# Patient Record
Sex: FEMALE | Race: WHITE | NOT HISPANIC OR LATINO | Employment: FULL TIME | URBAN - METROPOLITAN AREA
[De-identification: names, ages, dates, MRNs, and addresses within clinical notes are randomized per-mention and may not be internally consistent; named-entity substitution may affect disease eponyms.]

---

## 2017-09-01 ENCOUNTER — OFFICE VISIT (OUTPATIENT)
Dept: FAMILY MEDICINE CLINIC | Facility: CLINIC | Age: 27
End: 2017-09-01

## 2017-09-01 VITALS
HEIGHT: 62 IN | DIASTOLIC BLOOD PRESSURE: 82 MMHG | BODY MASS INDEX: 44.53 KG/M2 | SYSTOLIC BLOOD PRESSURE: 124 MMHG | OXYGEN SATURATION: 95 % | HEART RATE: 58 BPM | TEMPERATURE: 98.2 F | WEIGHT: 242 LBS

## 2017-09-01 DIAGNOSIS — F32.A ANXIETY AND DEPRESSION: Primary | ICD-10-CM

## 2017-09-01 DIAGNOSIS — F41.9 ANXIETY AND DEPRESSION: Primary | ICD-10-CM

## 2017-09-01 DIAGNOSIS — L73.2 HIDRADENITIS SUPPURATIVA: ICD-10-CM

## 2017-09-01 DIAGNOSIS — Z00.00 ROUTINE GENERAL MEDICAL EXAMINATION AT A HEALTH CARE FACILITY: ICD-10-CM

## 2017-09-01 DIAGNOSIS — E66.01 OBESITY, CLASS III, BMI 40-49.9 (MORBID OBESITY) (HCC): ICD-10-CM

## 2017-09-01 PROBLEM — E66.813 OBESITY, CLASS III, BMI 40-49.9 (MORBID OBESITY): Status: ACTIVE | Noted: 2017-09-01

## 2017-09-01 PROCEDURE — 99204 OFFICE O/P NEW MOD 45 MIN: CPT | Performed by: NURSE PRACTITIONER

## 2017-09-01 RX ORDER — BUSPIRONE HYDROCHLORIDE 5 MG/1
5 TABLET ORAL 3 TIMES DAILY
Qty: 90 TABLET | Refills: 0 | Status: SHIPPED | OUTPATIENT
Start: 2017-09-01 | End: 2017-09-25 | Stop reason: SINTOL

## 2017-09-01 RX ORDER — CLINDAMYCIN PHOSPHATE 10 MG/G
GEL TOPICAL 2 TIMES DAILY
Qty: 60 G | Refills: 2 | Status: SHIPPED | OUTPATIENT
Start: 2017-09-01 | End: 2018-06-06 | Stop reason: SDUPTHER

## 2017-09-01 RX ORDER — PROPRANOLOL HYDROCHLORIDE 20 MG/1
20 TABLET ORAL DAILY
Refills: 0 | COMMUNITY
Start: 2017-06-09 | End: 2017-09-25

## 2017-09-01 NOTE — PROGRESS NOTES
Subjective   Star Rollins is a 27 y.o. female. Establish care and discuss weight management. She was going to primary care in [place] before coming here but has not been in a long time. Mother comes here as a patient. She was seeing a dermatologist for HS but cannot go back there because they do not take her insurance. She was being treated with Inderal on as needed basis for anxiety. She does not think that is working well for her because the anxiety is getting worse and she is having panic attacks now. She was diagnosed as a child with ADD but was never put on medication. She would like to see about getting put on something different for the anxiety. It's causing her significant distress and she is not sleeping well. States she recently has a break up with her SO and thinks this had made the anxiety worse.   She is also concerned about her weight and would like to see about getting something to help with this.     Anxiety   Presents for initial visit. Onset was more than 5 years ago. The problem has been gradually worsening. Symptoms include compulsions, decreased concentration, depressed mood, excessive worry, insomnia, irritability, nervous/anxious behavior, obsessions and panic. Patient reports no suicidal ideas. Symptoms occur constantly. The severity of symptoms is moderate. The symptoms are aggravated by social activities. The quality of sleep is fair. Nighttime awakenings: several.     Risk factors include family history and a major life event. Her past medical history is significant for anxiety/panic attacks. (ADD) Past treatments include nothing. The treatment provided mild relief. Compliance with prior treatments has been good.      Sores on abdomen and under arms. She has history of HS and has multple open areas right now that have pus draining from them as well as painful to touch. She has been using some old ointment given to her by the other dermatologist but does not seem to be working very well.  She does wash the areas with dial soap and applies the ointment.          The following portions of the patient's history were reviewed and updated as appropriate: allergies, current medications, past family history, past medical history, past social history, past surgical history and problem list.    Review of Systems   Constitutional: Positive for irritability.   HENT: Negative.    Eyes:        Pressure on optic nerves in the eyes.    Respiratory: Negative.    Cardiovascular: Negative.    Gastrointestinal: Negative.    Endocrine: Negative.    Genitourinary: Negative.    Musculoskeletal: Negative.    Skin:        HS- needs referral to new dermatologist     Allergic/Immunologic: Negative.    Neurological: Negative.    Hematological: Negative.    Psychiatric/Behavioral: Positive for decreased concentration and dysphoric mood. Negative for self-injury and suicidal ideas. The patient is nervous/anxious and has insomnia.        Objective   Physical Exam   Constitutional: She is oriented to person, place, and time. Vital signs are normal. She appears well-developed and well-nourished. She is cooperative.   HENT:   Right Ear: External ear normal.   Left Ear: External ear normal.   Nose: Nose normal.   Mouth/Throat: Oropharynx is clear and moist.   Eyes: EOM are normal. Pupils are equal, round, and reactive to light.   Neck: Normal range of motion. No thyromegaly present.   Cardiovascular: Normal rate, regular rhythm, normal heart sounds and intact distal pulses.    Pulmonary/Chest: Effort normal and breath sounds normal.   Abdominal: Soft. Normal appearance and bowel sounds are normal.   Musculoskeletal: Normal range of motion.   Neurological: She is alert and oriented to person, place, and time. She has normal strength.   Skin: Skin is warm and dry.   Multiple areas of raised lesions on the abdomen, under both arms with central fluctuance, some erythema c/w HS ulcers. + scarring   Psychiatric: She has a normal mood and  "affect. Her behavior is normal. Judgment and thought content normal. Cognition and memory are normal.   Nursing note and vitals reviewed.    Vitals:    09/01/17 1452   BP: 124/82   Pulse: 58   Temp: 98.2 °F (36.8 °C)   TempSrc: Oral   SpO2: 95%   Weight: 242 lb (110 kg)   Height: 62\" (157.5 cm)         Assessment/Plan   Diagnoses and all orders for this visit:    Anxiety and depression  -     busPIRone (BUSPAR) 5 MG tablet; Take 1 tablet by mouth 3 (Three) Times a Day.    Hidradenitis suppurativa  -     Ambulatory Referral to Dermatology  -     clindamycin (CLINDAGEL) 1 % gel; Apply  topically 2 (Two) Times a Day.    Routine general medical examination at a health care facility  -     CBC & Differential  -     Comprehensive Metabolic Panel  -     Hemoglobin A1c  -     Lipid Panel  -     TSH  -     Vitamin D 25 Hydroxy  -     Insulin, Random  -     T4, Free    Obesity, Class III, BMI 40-49.9 (morbid obesity)  -     TSH  -     Insulin, Random    Anxiety and depression: Will get her started on some Buspar to treat anxiety and depression. Start with once daily for one week, then increase to twice daily and then TID if needed. Follow up in 3-4 weeks for recheck. Will adjust medication as needed.    HS: Will refer to dermatology. Use clindagel to treat the open areas.    Will get labs for health screening. If all is ok, she will make an appointment to discuss options for weight management.   Will get her medical records and review.           "

## 2017-09-06 LAB
25(OH)D3+25(OH)D2 SERPL-MCNC: 29.9 NG/ML (ref 30–100)
ALBUMIN SERPL-MCNC: 4.5 G/DL (ref 3.5–5.5)
ALBUMIN/GLOB SERPL: 1.5 {RATIO} (ref 1.2–2.2)
ALP SERPL-CCNC: 39 IU/L (ref 39–117)
ALT SERPL-CCNC: 14 IU/L (ref 0–32)
AST SERPL-CCNC: 13 IU/L (ref 0–40)
BASOPHILS # BLD AUTO: 0 X10E3/UL (ref 0–0.2)
BASOPHILS NFR BLD AUTO: 0 %
BILIRUB SERPL-MCNC: 0.3 MG/DL (ref 0–1.2)
BUN SERPL-MCNC: 21 MG/DL (ref 6–20)
BUN/CREAT SERPL: 25 (ref 9–23)
CALCIUM SERPL-MCNC: 9.3 MG/DL (ref 8.7–10.2)
CHLORIDE SERPL-SCNC: 100 MMOL/L (ref 96–106)
CHOLEST SERPL-MCNC: 138 MG/DL (ref 100–199)
CO2 SERPL-SCNC: 20 MMOL/L (ref 18–29)
CREAT SERPL-MCNC: 0.84 MG/DL (ref 0.57–1)
EOSINOPHIL # BLD AUTO: 0.1 X10E3/UL (ref 0–0.4)
EOSINOPHIL NFR BLD AUTO: 2 %
ERYTHROCYTE [DISTWIDTH] IN BLOOD BY AUTOMATED COUNT: 13.3 % (ref 12.3–15.4)
GLOBULIN SER CALC-MCNC: 3 G/DL (ref 1.5–4.5)
GLUCOSE SERPL-MCNC: 87 MG/DL (ref 65–99)
HBA1C MFR BLD: 5.2 % (ref 4.8–5.6)
HCT VFR BLD AUTO: 38.7 % (ref 34–46.6)
HDLC SERPL-MCNC: 37 MG/DL
HGB BLD-MCNC: 12.5 G/DL (ref 11.1–15.9)
IMM GRANULOCYTES # BLD: 0 X10E3/UL (ref 0–0.1)
IMM GRANULOCYTES NFR BLD: 0 %
INSULIN SERPL-ACNC: 12 UIU/ML
LDLC SERPL CALC-MCNC: 84 MG/DL (ref 0–99)
LYMPHOCYTES # BLD AUTO: 2 X10E3/UL (ref 0.7–3.1)
LYMPHOCYTES NFR BLD AUTO: 22 %
MCH RBC QN AUTO: 30.4 PG (ref 26.6–33)
MCHC RBC AUTO-ENTMCNC: 32.3 G/DL (ref 31.5–35.7)
MCV RBC AUTO: 94 FL (ref 79–97)
MONOCYTES # BLD AUTO: 0.5 X10E3/UL (ref 0.1–0.9)
MONOCYTES NFR BLD AUTO: 6 %
NEUTROPHILS # BLD AUTO: 6.2 X10E3/UL (ref 1.4–7)
NEUTROPHILS NFR BLD AUTO: 70 %
PLATELET # BLD AUTO: 317 X10E3/UL (ref 150–379)
POTASSIUM SERPL-SCNC: 4.7 MMOL/L (ref 3.5–5.2)
PROT SERPL-MCNC: 7.5 G/DL (ref 6–8.5)
RBC # BLD AUTO: 4.11 X10E6/UL (ref 3.77–5.28)
SODIUM SERPL-SCNC: 141 MMOL/L (ref 134–144)
T4 FREE SERPL-MCNC: 1.19 NG/DL (ref 0.82–1.77)
TRIGL SERPL-MCNC: 86 MG/DL (ref 0–149)
TSH SERPL DL<=0.005 MIU/L-ACNC: 1.56 UIU/ML (ref 0.45–4.5)
VLDLC SERPL CALC-MCNC: 17 MG/DL (ref 5–40)
WBC # BLD AUTO: 8.8 X10E3/UL (ref 3.4–10.8)

## 2017-09-07 ENCOUNTER — TELEPHONE (OUTPATIENT)
Dept: FAMILY MEDICINE CLINIC | Facility: CLINIC | Age: 27
End: 2017-09-07

## 2017-09-07 NOTE — TELEPHONE ENCOUNTER
----- Message from NICKY Kwon sent at 9/7/2017  8:35 AM EDT -----  Call the patient on the lab results. Labs all look good. The thyroid studies were normal range. Recommend rescreen every 1 to 2 years.

## 2017-09-15 ENCOUNTER — OFFICE VISIT (OUTPATIENT)
Dept: FAMILY MEDICINE CLINIC | Facility: CLINIC | Age: 27
End: 2017-09-15

## 2017-09-15 VITALS
SYSTOLIC BLOOD PRESSURE: 126 MMHG | BODY MASS INDEX: 44.35 KG/M2 | HEART RATE: 73 BPM | DIASTOLIC BLOOD PRESSURE: 74 MMHG | HEIGHT: 62 IN | OXYGEN SATURATION: 99 % | TEMPERATURE: 98.5 F | WEIGHT: 241 LBS

## 2017-09-15 DIAGNOSIS — Z79.899 MEDICATION MANAGEMENT: ICD-10-CM

## 2017-09-15 DIAGNOSIS — E66.01 OBESITY, CLASS III, BMI 40-49.9 (MORBID OBESITY) (HCC): Primary | ICD-10-CM

## 2017-09-15 PROCEDURE — 99213 OFFICE O/P EST LOW 20 MIN: CPT | Performed by: NURSE PRACTITIONER

## 2017-09-15 RX ORDER — PHENTERMINE HYDROCHLORIDE 37.5 MG/1
37.5 TABLET ORAL
Qty: 30 TABLET | Refills: 0 | Status: SHIPPED | OUTPATIENT
Start: 2017-09-15 | End: 2019-09-06

## 2017-09-15 NOTE — PROGRESS NOTES
"Subjective   Star Rollins is a 27 y.o. female. Weight management. She would like to use something to help with her weight and to control her appetite. She often eats due to boredom. She does drink water but also drinks Coke Zero. She does not exercise regularly at this time. She has tried some weight loss drugs in the past and lost some weight with them. She is interested in trying the phentermine for now.     Obesity   This is a new problem. The current episode started more than 1 year ago. The problem occurs constantly. Exacerbated by: boredom, family history. Treatments tried: gym, stopped soft drinks, daily food intake. The treatment provided no relief.        The following portions of the patient's history were reviewed and updated as appropriate: allergies, current medications, past medical history, past social history, past surgical history and problem list.    Review of Systems   Constitutional: Negative.    Respiratory: Negative.    Cardiovascular: Negative.        Objective   Physical Exam   Constitutional: Vital signs are normal. She appears well-developed and well-nourished. She is cooperative.   Cardiovascular: Normal rate, regular rhythm and normal heart sounds.    Pulmonary/Chest: Effort normal and breath sounds normal.   Neurological: She is alert.   Psychiatric: She has a normal mood and affect. Her speech is normal and behavior is normal. Judgment and thought content normal. Cognition and memory are normal.   Nursing note and vitals reviewed.    Vitals:    09/15/17 1545   BP: 126/74   Pulse: 73   Temp: 98.5 °F (36.9 °C)   TempSrc: Oral   SpO2: 99%   Weight: 241 lb (109 kg)   Height: 62\" (157.5 cm)       Assessment/Plan   Diagnoses and all orders for this visit:    Obesity, Class III, BMI 40-49.9 (morbid obesity)  -     phentermine (ADIPEX-P) 37.5 MG tablet; Take 1 tablet by mouth Every Morning Before Breakfast.  -     Pain Management Profile (13 Drugs) Urine    Medication management  -     Pain " Management Profile (13 Drugs) Urine    As part of this patient's treatment plan, I am prescribing controlled substances. The patient has been made aware of appropriate use of such medications, including potential risk of somnolence, limited ability to drive and /or work safely, and potential for dependence or overdose. It has also been made clear that these medications are for use by this patient only, without concomitant use of alcohol or other substances unless prescribed.   GLENNA report has been reviewed by NICKY Bran and is appropriate to prescribed use and will be scanned into the chart.   Patient has completed prescribing agreement detailing terms of continued prescribing of controlled substances, including monitoring GLENNA reports, urine drug screening, and pill counts if necessary. The patient is aware that inappropriate use will result in cessation of prescribing such medications.  History and physical exam exhibits she will be compliant with continued safe and appropriate use of controlled substances.    We have had a long discussion and weight loss, healthy lifestyles, regular exercise and developing good eating habits. We discussed various medications to help with weight loss. She has decided for now on the phentermine. We discussed the appropriate goals.   LT lbs. STG: 10 lbs and increased exercise.  Keep food diary.  Start with regular exercise program  Increase water consumption  Follow up in one month

## 2017-09-16 LAB
AMPHETAMINES UR QL SCN: NEGATIVE NG/ML
BARBITURATES UR QL SCN: NEGATIVE NG/ML
BENZODIAZ UR QL SCN: NEGATIVE NG/ML
BZE UR QL SCN: NEGATIVE NG/ML
CANNABINOIDS UR QL SCN: NEGATIVE NG/ML
CREAT UR-MCNC: 138 MG/DL (ref 20–300)
FENTANYL+NORFENTANYL UR QL SCN: NEGATIVE PG/ML
Lab: NORMAL
MEPERIDINE UR QL: NEGATIVE NG/ML
METHADONE UR QL SCN: NEGATIVE NG/ML
OPIATES UR QL SCN: NEGATIVE NG/ML
OXYCODONE+OXYMORPHONE UR QL SCN: NEGATIVE NG/ML
PCP UR QL: NEGATIVE NG/ML
PH UR: 5.4 [PH] (ref 4.5–8.9)
PROPOXYPH UR QL SCN: NEGATIVE NG/ML
SP GR UR: 1.02
TRAMADOL UR QL SCN: NEGATIVE NG/ML

## 2017-09-19 ENCOUNTER — TELEPHONE (OUTPATIENT)
Dept: FAMILY MEDICINE CLINIC | Facility: CLINIC | Age: 27
End: 2017-09-19

## 2017-09-19 NOTE — TELEPHONE ENCOUNTER
She stopped taking the Buspar? Clarify with the patient please. Is she wanting something besides the Buspar?

## 2017-09-19 NOTE — TELEPHONE ENCOUNTER
Yes she did not take buspar today. She is currently at work so I can call her back later and try to talk with patient.

## 2017-09-25 ENCOUNTER — OFFICE VISIT (OUTPATIENT)
Dept: FAMILY MEDICINE CLINIC | Facility: CLINIC | Age: 27
End: 2017-09-25

## 2017-09-25 VITALS
DIASTOLIC BLOOD PRESSURE: 76 MMHG | TEMPERATURE: 98.6 F | WEIGHT: 238 LBS | HEART RATE: 78 BPM | HEIGHT: 62 IN | BODY MASS INDEX: 43.79 KG/M2 | SYSTOLIC BLOOD PRESSURE: 128 MMHG | OXYGEN SATURATION: 98 %

## 2017-09-25 DIAGNOSIS — G47.00 INSOMNIA, UNSPECIFIED TYPE: Primary | ICD-10-CM

## 2017-09-25 PROCEDURE — 99213 OFFICE O/P EST LOW 20 MIN: CPT | Performed by: NURSE PRACTITIONER

## 2017-09-25 RX ORDER — TRAZODONE HYDROCHLORIDE 50 MG/1
50 TABLET ORAL NIGHTLY
Qty: 30 TABLET | Refills: 0 | Status: SHIPPED | OUTPATIENT
Start: 2017-09-25 | End: 2019-09-06

## 2017-09-25 NOTE — PROGRESS NOTES
Subjective   Star Rollins is a 27 y.o. female. She is here for anxiety follow up. She stopped taking buspar because she was having nightmares and her heart was racing. She had taken one/half of the phentermine tablets and is not sure if that in addition to the Buspar is what caused the problem. She had noticed the night before taking the phentermine that she was having trouble sleeping again, so doesn't think it's the phentermine. The pharmacist advised her to stop the Buspar for now. She states that it's mostly at night when she has problems with the anxiety. She doesn't seem to shut down so she can actually fall asleep then she gets worked up about that.   She is also having problems with her right ear. Noticed a couple of days ago that the right ear has pressure in it. It comes and goes. Not sure if it's because her allergies are acting up or something. The ear does not really hurt. She has no fever or headache, just pressure off and on.     Anxiety   Presents for follow-up visit. Symptoms include nervous/anxious behavior. Primary symptoms comment: cannot seem to stop her brain to go to sleep. Symptoms occur most days. The quality of sleep is poor. Nighttime awakenings: occasional.        Right ear pressure: New problems. Started about 3-4 days ago. No fever or chills. No ear pain. Has history of allergies and sometimes the ears bother her during times when the allergies are acting up.     The following portions of the patient's history were reviewed and updated as appropriate: allergies, current medications, past medical history, past social history, past surgical history and problem list.    Review of Systems   Constitutional: Negative.    Respiratory: Negative.    Cardiovascular: Negative.    Psychiatric/Behavioral: Positive for sleep disturbance (insomnia). The patient is nervous/anxious.        Objective   Physical Exam   Constitutional: Vital signs are normal. She appears well-developed and  "well-nourished. She is cooperative.   HENT:   Right Ear: Hearing normal. Tympanic membrane is scarred. Tympanic membrane is not erythematous and not bulging. No middle ear effusion.   Left Ear: Hearing and tympanic membrane normal.   Right TM dull,    Cardiovascular: Normal rate, regular rhythm and normal heart sounds.    Pulmonary/Chest: Effort normal and breath sounds normal.   Neurological: She is alert.   Psychiatric: She has a normal mood and affect. Her speech is normal and behavior is normal. Judgment and thought content normal. Cognition and memory are normal.   Nursing note and vitals reviewed.    Vitals:    09/25/17 1550   BP: 128/76   Pulse: 78   Temp: 98.6 °F (37 °C)   TempSrc: Oral   SpO2: 98%   Weight: 238 lb (108 kg)   Height: 62\" (157.5 cm)       Assessment/Plan   Diagnoses and all orders for this visit:    Insomnia, unspecified type  -     traZODone (DESYREL) 50 MG tablet; Take 1 tablet by mouth Every Night.    Anxiety: likely in conjunction with the insomnia. Will try some trazodone to see if will induce sleep.  Ok to try the phentermine again at 1/2 tablet. If elevated blood pressure or heart rate, stop taking.  Discontinue the Buspar and Inderal.   Follow up in one month.    Allergies: Use antihistamine once daily. If ear is still bothering her in 4-5 days, call back and will try antibiotics at that time.              "

## 2017-10-19 ENCOUNTER — TELEPHONE (OUTPATIENT)
Dept: FAMILY MEDICINE CLINIC | Facility: CLINIC | Age: 27
End: 2017-10-19

## 2017-10-27 NOTE — TELEPHONE ENCOUNTER
Ok, we can do the referral but we need to get confirmation of whether she is seeing a neurologist.

## 2018-04-25 ENCOUNTER — CLINICAL SUPPORT (OUTPATIENT)
Dept: FAMILY MEDICINE CLINIC | Facility: CLINIC | Age: 28
End: 2018-04-25

## 2018-04-25 PROCEDURE — 90471 IMMUNIZATION ADMIN: CPT | Performed by: NURSE PRACTITIONER

## 2018-04-25 PROCEDURE — 90632 HEPA VACCINE ADULT IM: CPT | Performed by: NURSE PRACTITIONER

## 2018-06-06 ENCOUNTER — OFFICE VISIT (OUTPATIENT)
Dept: FAMILY MEDICINE CLINIC | Facility: CLINIC | Age: 28
End: 2018-06-06

## 2018-06-06 VITALS
WEIGHT: 250 LBS | TEMPERATURE: 98.4 F | BODY MASS INDEX: 46.01 KG/M2 | DIASTOLIC BLOOD PRESSURE: 76 MMHG | OXYGEN SATURATION: 98 % | HEIGHT: 62 IN | SYSTOLIC BLOOD PRESSURE: 132 MMHG | HEART RATE: 74 BPM

## 2018-06-06 DIAGNOSIS — L73.2 HIDRADENITIS SUPPURATIVA: Primary | ICD-10-CM

## 2018-06-06 DIAGNOSIS — L73.2 HIDRADENITIS SUPPURATIVA OF LEFT AXILLA: ICD-10-CM

## 2018-06-06 PROCEDURE — 99213 OFFICE O/P EST LOW 20 MIN: CPT | Performed by: NURSE PRACTITIONER

## 2018-06-06 RX ORDER — DOXYCYCLINE HYCLATE 100 MG
100 TABLET ORAL 2 TIMES DAILY
Qty: 20 TABLET | Refills: 0 | Status: SHIPPED | OUTPATIENT
Start: 2018-06-06 | End: 2019-09-06

## 2018-06-06 RX ORDER — CLINDAMYCIN PHOSPHATE 10 MG/G
GEL TOPICAL EVERY 12 HOURS SCHEDULED
Qty: 60 G | Refills: 2 | Status: SHIPPED | OUTPATIENT
Start: 2018-06-06 | End: 2019-09-06

## 2018-06-06 RX ORDER — ACETAZOLAMIDE 125 MG/1
125 TABLET ORAL 2 TIMES DAILY
COMMUNITY

## 2018-06-06 NOTE — PROGRESS NOTES
"Subjective   Star PACHECO Rollins is a 27 y.o. female. She is having break outs under her arms. Has history of HS. These started a few weeks ago but going to [facility] in a week and doesn't want them to get infected while she is gone. She has been using the Clindagel sporadically. Helps some. Was given doxycycline in the past when she had a flare up. Has not been to see dermatologist because they don't take her insurance.       Wound Infection   Chronicity: HS, chronic, intermittent. The problem occurs constantly. The problem has been gradually worsening. Associated symptoms comments: Drainage  . Nothing aggravates the symptoms. Treatments tried: cleansing, Clindagel. The treatment provided mild relief.        The following portions of the patient's history were reviewed and updated as appropriate: allergies, current medications, past family history, past medical history, past social history, past surgical history and problem list.    Review of Systems   Constitutional: Negative.    Respiratory: Negative.    Cardiovascular: Negative.    Skin:        Lesion under the left arm.        Objective   Physical Exam   Constitutional: She appears well-developed and well-nourished.   Cardiovascular: Normal rate and regular rhythm.    Pulmonary/Chest: Effort normal and breath sounds normal.   Skin: Skin is warm. Lesion noted.   Multiple draining lesions with tracts in the right axilla. Drainage is serosanguineous. Has some lesions under the breasts as well however not draining   Nursing note and vitals reviewed.    Vitals:    06/06/18 1257   BP: 132/76   Pulse: 74   Temp: 98.4 °F (36.9 °C)   TempSrc: Oral   SpO2: 98%   Weight: 113 kg (250 lb)   Height: 157.5 cm (62.01\")       Assessment/Plan   Diagnoses and all orders for this visit:    Hidradenitis suppurativa  -     doxycycline (VIBRAMYICN) 100 MG tablet; Take 1 tablet by mouth 2 (Two) Times a Day.  -     clindamycin (CLINDAGEL) 1 % gel; Apply  topically Every 12 (Twelve) Hours.  -     " Ambulatory Referral to Dermatology    Hidradenitis suppurativa of left axilla  -     doxycycline (VIBRAMYICN) 100 MG tablet; Take 1 tablet by mouth 2 (Two) Times a Day.  -     clindamycin (CLINDAGEL) 1 % gel; Apply  topically Every 12 (Twelve) Hours.    Will try again to get her in to see dermatologist.  Start doxycycline.  10% oxy body wash  Continue clindagel  Advised to stop soft drinks and minimize sugar intake  RTC if not improved

## 2018-11-07 ENCOUNTER — CLINICAL SUPPORT (OUTPATIENT)
Dept: FAMILY MEDICINE CLINIC | Facility: CLINIC | Age: 28
End: 2018-11-07

## 2018-11-07 DIAGNOSIS — Z23 FLU VACCINE NEED: Primary | ICD-10-CM

## 2018-11-07 DIAGNOSIS — Z23 NEED FOR HEPATITIS A VACCINATION: ICD-10-CM

## 2018-11-07 DIAGNOSIS — L73.2 HIDRADENITIS SUPPURATIVA: Primary | ICD-10-CM

## 2018-11-07 PROCEDURE — 90674 CCIIV4 VAC NO PRSV 0.5 ML IM: CPT | Performed by: NURSE PRACTITIONER

## 2018-11-07 PROCEDURE — 90471 IMMUNIZATION ADMIN: CPT | Performed by: NURSE PRACTITIONER

## 2018-11-07 PROCEDURE — 90472 IMMUNIZATION ADMIN EACH ADD: CPT | Performed by: NURSE PRACTITIONER

## 2018-11-07 PROCEDURE — 90632 HEPA VACCINE ADULT IM: CPT | Performed by: NURSE PRACTITIONER

## 2019-09-06 ENCOUNTER — OFFICE VISIT (OUTPATIENT)
Dept: FAMILY MEDICINE CLINIC | Facility: CLINIC | Age: 29
End: 2019-09-06

## 2019-09-06 VITALS
HEIGHT: 62 IN | DIASTOLIC BLOOD PRESSURE: 80 MMHG | BODY MASS INDEX: 45.45 KG/M2 | SYSTOLIC BLOOD PRESSURE: 128 MMHG | WEIGHT: 247 LBS | HEART RATE: 75 BPM | OXYGEN SATURATION: 99 % | TEMPERATURE: 98.5 F

## 2019-09-06 DIAGNOSIS — Z51.81 MEDICATION MONITORING ENCOUNTER: Primary | ICD-10-CM

## 2019-09-06 DIAGNOSIS — F41.9 ANXIETY: ICD-10-CM

## 2019-09-06 PROBLEM — E66.9 OBESITY: Status: ACTIVE | Noted: 2017-09-01

## 2019-09-06 PROCEDURE — 99213 OFFICE O/P EST LOW 20 MIN: CPT | Performed by: NURSE PRACTITIONER

## 2019-09-06 RX ORDER — DOXYCYCLINE 50 MG/1
CAPSULE ORAL AS NEEDED
COMMUNITY
Start: 2019-08-26 | End: 2020-11-20

## 2019-09-06 RX ORDER — PROPRANOLOL HYDROCHLORIDE 20 MG/1
20 TABLET ORAL 2 TIMES DAILY PRN
Qty: 60 TABLET | Refills: 0 | Status: SHIPPED | OUTPATIENT
Start: 2019-09-06 | End: 2019-10-29 | Stop reason: SDUPTHER

## 2019-09-06 RX ORDER — ACETAZOLAMIDE 250 MG/1
TABLET ORAL
COMMUNITY
Start: 2019-08-26 | End: 2019-09-06

## 2019-09-06 NOTE — PROGRESS NOTES
"Subjective   Star Rollins is a 29 y.o. female who presents for a routine check up. Needs liver function checked as requested by eye doctor.   History of Present Illness   Smoky smell x 3 weeks, taking zyrtec, doesn't smoke and not around smoke. No taste change, but severe enough causing headache. Has been using nasal spray, helping a little. Comes and goes. Allergies not overly flared.   Previously was on anxiety medication, propranolol, worked. Was also on buspirone and made heart race and anxiety worse, so stopped. Would like to restart propranolol. Gets anxiety over driving, worry. Teenage son. Has always been like that.   The following portions of the patient's history were reviewed and updated as appropriate: allergies, current medications, past family history, past medical history, past social history, past surgical history and problem list.    Review of Systems   Constitutional: Negative for activity change, appetite change, chills, fatigue, fever, unexpected weight gain and unexpected weight loss.   HENT: Negative for congestion, dental problem and rhinorrhea.         Smell perversion   Respiratory: Negative.  Negative for shortness of breath.    Cardiovascular: Negative.  Negative for chest pain, palpitations and leg swelling.   Psychiatric/Behavioral: Positive for decreased concentration (has ADD. diagnosed as kid, never on medications), sleep disturbance (mostly at night, has trouble shutting off brain) and positive for hyperactivity. Negative for agitation, behavioral problems, dysphoric mood, hallucinations, suicidal ideas and depressed mood. The patient is nervous/anxious.      /80   Pulse 75   Temp 98.5 °F (36.9 °C) (Oral)   Ht 157.5 cm (62\")   Wt 112 kg (247 lb)   SpO2 99%   BMI 45.18 kg/m²     Objective   Physical Exam   Constitutional: She appears well-developed and well-nourished.   Neck: Normal range of motion. Neck supple. No thyromegaly present.   Cardiovascular: Normal rate, " regular rhythm and normal heart sounds.   Pulmonary/Chest: Effort normal and breath sounds normal.   Lymphadenopathy:     She has no cervical adenopathy.   Skin: Skin is warm and dry. Capillary refill takes less than 2 seconds.   Psychiatric: Her speech is normal and behavior is normal. Judgment and thought content normal. Her mood appears anxious. Cognition and memory are normal.   Nursing note and vitals reviewed.    Assessment/Plan   Problems Addressed this Visit     None      Visit Diagnoses     Medication monitoring encounter    -  Primary    Relevant Orders    CBC & Differential (Completed)    Comprehensive Metabolic Panel (Completed)    Anxiety        Relevant Medications    propranolol (INDERAL) 20 MG tablet        Medication monitoring--check labs for ophthalmology, working well for eye pressures, now on long term. Will need labs q 6 months, outpt, to call. CBC, CMP  Anxiety--did well on propranolol in the past, reasonable to restart, adjust dose as necessary. If any worsening, stop right away, ER if needed.  FU if not satisfied with anxiety control 1 month.

## 2019-09-07 LAB
ALBUMIN SERPL-MCNC: 4.4 G/DL (ref 3.5–5.5)
ALBUMIN/GLOB SERPL: 1.4 {RATIO} (ref 1.2–2.2)
ALP SERPL-CCNC: 48 IU/L (ref 39–117)
ALT SERPL-CCNC: 14 IU/L (ref 0–32)
AST SERPL-CCNC: 17 IU/L (ref 0–40)
BASOPHILS # BLD AUTO: 0 X10E3/UL (ref 0–0.2)
BASOPHILS NFR BLD AUTO: 0 %
BILIRUB SERPL-MCNC: 0.3 MG/DL (ref 0–1.2)
BUN SERPL-MCNC: 20 MG/DL (ref 6–20)
BUN/CREAT SERPL: 24 (ref 9–23)
CALCIUM SERPL-MCNC: 9.4 MG/DL (ref 8.7–10.2)
CHLORIDE SERPL-SCNC: 104 MMOL/L (ref 96–106)
CO2 SERPL-SCNC: 23 MMOL/L (ref 20–29)
CREAT SERPL-MCNC: 0.85 MG/DL (ref 0.57–1)
EOSINOPHIL # BLD AUTO: 0.3 X10E3/UL (ref 0–0.4)
EOSINOPHIL NFR BLD AUTO: 3 %
ERYTHROCYTE [DISTWIDTH] IN BLOOD BY AUTOMATED COUNT: 13.2 % (ref 12.3–15.4)
GLOBULIN SER CALC-MCNC: 3.2 G/DL (ref 1.5–4.5)
GLUCOSE SERPL-MCNC: 80 MG/DL (ref 65–99)
HCT VFR BLD AUTO: 40.1 % (ref 34–46.6)
HGB BLD-MCNC: 13.2 G/DL (ref 11.1–15.9)
IMM GRANULOCYTES # BLD AUTO: 0 X10E3/UL (ref 0–0.1)
IMM GRANULOCYTES NFR BLD AUTO: 0 %
LYMPHOCYTES # BLD AUTO: 2.3 X10E3/UL (ref 0.7–3.1)
LYMPHOCYTES NFR BLD AUTO: 29 %
MCH RBC QN AUTO: 31 PG (ref 26.6–33)
MCHC RBC AUTO-ENTMCNC: 32.9 G/DL (ref 31.5–35.7)
MCV RBC AUTO: 94 FL (ref 79–97)
MONOCYTES # BLD AUTO: 0.7 X10E3/UL (ref 0.1–0.9)
MONOCYTES NFR BLD AUTO: 9 %
NEUTROPHILS # BLD AUTO: 4.8 X10E3/UL (ref 1.4–7)
NEUTROPHILS NFR BLD AUTO: 59 %
PLATELET # BLD AUTO: 317 X10E3/UL (ref 150–450)
POTASSIUM SERPL-SCNC: 4.3 MMOL/L (ref 3.5–5.2)
PROT SERPL-MCNC: 7.6 G/DL (ref 6–8.5)
RBC # BLD AUTO: 4.26 X10E6/UL (ref 3.77–5.28)
SODIUM SERPL-SCNC: 142 MMOL/L (ref 134–144)
WBC # BLD AUTO: 8.1 X10E3/UL (ref 3.4–10.8)

## 2019-10-07 ENCOUNTER — FLU SHOT (OUTPATIENT)
Dept: FAMILY MEDICINE CLINIC | Facility: CLINIC | Age: 29
End: 2019-10-07

## 2019-10-07 DIAGNOSIS — Z23 NEED FOR INFLUENZA VACCINATION: ICD-10-CM

## 2019-10-07 PROCEDURE — 90686 IIV4 VACC NO PRSV 0.5 ML IM: CPT | Performed by: NURSE PRACTITIONER

## 2019-10-07 PROCEDURE — 90471 IMMUNIZATION ADMIN: CPT | Performed by: NURSE PRACTITIONER

## 2019-10-29 DIAGNOSIS — F41.9 ANXIETY: ICD-10-CM

## 2019-10-30 RX ORDER — PROPRANOLOL HYDROCHLORIDE 20 MG/1
TABLET ORAL
Qty: 60 TABLET | Refills: 0 | Status: SHIPPED | OUTPATIENT
Start: 2019-10-30 | End: 2021-10-01 | Stop reason: SDUPTHER

## 2020-10-26 ENCOUNTER — APPOINTMENT (OUTPATIENT)
Dept: GENERAL RADIOLOGY | Facility: HOSPITAL | Age: 30
End: 2020-10-26

## 2020-10-26 ENCOUNTER — APPOINTMENT (OUTPATIENT)
Dept: CT IMAGING | Facility: HOSPITAL | Age: 30
End: 2020-10-26

## 2020-10-26 ENCOUNTER — HOSPITAL ENCOUNTER (EMERGENCY)
Facility: HOSPITAL | Age: 30
Discharge: HOME | End: 2020-10-26
Attending: EMERGENCY MEDICINE | Admitting: EMERGENCY MEDICINE

## 2020-10-26 VITALS
SYSTOLIC BLOOD PRESSURE: 128 MMHG | DIASTOLIC BLOOD PRESSURE: 82 MMHG | HEIGHT: 62 IN | BODY MASS INDEX: 46.01 KG/M2 | HEART RATE: 92 BPM | RESPIRATION RATE: 16 BRPM | OXYGEN SATURATION: 98 % | TEMPERATURE: 98.8 F | WEIGHT: 250 LBS

## 2020-10-26 DIAGNOSIS — R09.1 PLEURISY: Primary | ICD-10-CM

## 2020-10-26 LAB
ALBUMIN SERPL-MCNC: 4.5 G/DL (ref 3.5–5.2)
ALBUMIN/GLOB SERPL: 1.3 G/DL
ALP SERPL-CCNC: 51 U/L (ref 39–117)
ALT SERPL W P-5'-P-CCNC: 13 U/L (ref 1–33)
ANION GAP SERPL CALCULATED.3IONS-SCNC: 10.4 MMOL/L (ref 5–15)
AST SERPL-CCNC: 12 U/L (ref 1–32)
BACTERIA UR QL AUTO: ABNORMAL /HPF
BASOPHILS # BLD AUTO: 0.05 10*3/MM3 (ref 0–0.2)
BASOPHILS NFR BLD AUTO: 0.5 % (ref 0–1.5)
BILIRUB SERPL-MCNC: 0.3 MG/DL (ref 0–1.2)
BILIRUB UR QL STRIP: NEGATIVE
BUN SERPL-MCNC: 22 MG/DL (ref 6–20)
BUN/CREAT SERPL: 26.2 (ref 7–25)
CALCIUM SPEC-SCNC: 9.4 MG/DL (ref 8.6–10.5)
CHLORIDE SERPL-SCNC: 105 MMOL/L (ref 98–107)
CLARITY UR: CLEAR
CO2 SERPL-SCNC: 21.6 MMOL/L (ref 22–29)
COLOR UR: YELLOW
CREAT SERPL-MCNC: 0.84 MG/DL (ref 0.57–1)
D DIMER PPP FEU-MCNC: 0.62 MCGFEU/ML (ref 0–0.49)
DEPRECATED RDW RBC AUTO: 39.2 FL (ref 37–54)
EOSINOPHIL # BLD AUTO: 0.17 10*3/MM3 (ref 0–0.4)
EOSINOPHIL NFR BLD AUTO: 1.6 % (ref 0.3–6.2)
ERYTHROCYTE [DISTWIDTH] IN BLOOD BY AUTOMATED COUNT: 11.6 % (ref 12.3–15.4)
GFR SERPL CREATININE-BSD FRML MDRD: 80 ML/MIN/1.73
GLOBULIN UR ELPH-MCNC: 3.5 GM/DL
GLUCOSE SERPL-MCNC: 96 MG/DL (ref 65–99)
GLUCOSE UR STRIP-MCNC: NEGATIVE MG/DL
HCT VFR BLD AUTO: 39.9 % (ref 34–46.6)
HGB BLD-MCNC: 13.5 G/DL (ref 12–15.9)
HGB UR QL STRIP.AUTO: NEGATIVE
HYALINE CASTS UR QL AUTO: ABNORMAL /LPF
IMM GRANULOCYTES # BLD AUTO: 0.04 10*3/MM3 (ref 0–0.05)
IMM GRANULOCYTES NFR BLD AUTO: 0.4 % (ref 0–0.5)
KETONES UR QL STRIP: NEGATIVE
LEUKOCYTE ESTERASE UR QL STRIP.AUTO: ABNORMAL
LIPASE SERPL-CCNC: 16 U/L (ref 13–60)
LYMPHOCYTES # BLD AUTO: 2.01 10*3/MM3 (ref 0.7–3.1)
LYMPHOCYTES NFR BLD AUTO: 19.4 % (ref 19.6–45.3)
MCH RBC QN AUTO: 31.2 PG (ref 26.6–33)
MCHC RBC AUTO-ENTMCNC: 33.8 G/DL (ref 31.5–35.7)
MCV RBC AUTO: 92.1 FL (ref 79–97)
MONOCYTES # BLD AUTO: 0.78 10*3/MM3 (ref 0.1–0.9)
MONOCYTES NFR BLD AUTO: 7.5 % (ref 5–12)
NEUTROPHILS NFR BLD AUTO: 7.33 10*3/MM3 (ref 1.7–7)
NEUTROPHILS NFR BLD AUTO: 70.6 % (ref 42.7–76)
NITRITE UR QL STRIP: NEGATIVE
NRBC BLD AUTO-RTO: 0 /100 WBC (ref 0–0.2)
PH UR STRIP.AUTO: 5.5 [PH] (ref 5–8)
PLATELET # BLD AUTO: 306 10*3/MM3 (ref 140–450)
PMV BLD AUTO: 9.5 FL (ref 6–12)
POTASSIUM SERPL-SCNC: 3.8 MMOL/L (ref 3.5–5.2)
PROT SERPL-MCNC: 8 G/DL (ref 6–8.5)
PROT UR QL STRIP: ABNORMAL
RBC # BLD AUTO: 4.33 10*6/MM3 (ref 3.77–5.28)
RBC # UR: ABNORMAL /HPF
REF LAB TEST METHOD: ABNORMAL
SODIUM SERPL-SCNC: 137 MMOL/L (ref 136–145)
SP GR UR STRIP: 1.02 (ref 1–1.03)
SQUAMOUS #/AREA URNS HPF: ABNORMAL /HPF
TROPONIN T SERPL-MCNC: <0.01 NG/ML (ref 0–0.03)
UROBILINOGEN UR QL STRIP: ABNORMAL
WBC # BLD AUTO: 10.38 10*3/MM3 (ref 3.4–10.8)
WBC UR QL AUTO: ABNORMAL /HPF

## 2020-10-26 PROCEDURE — 93010 ELECTROCARDIOGRAM REPORT: CPT | Performed by: INTERNAL MEDICINE

## 2020-10-26 PROCEDURE — 83690 ASSAY OF LIPASE: CPT | Performed by: EMERGENCY MEDICINE

## 2020-10-26 PROCEDURE — 93005 ELECTROCARDIOGRAM TRACING: CPT | Performed by: EMERGENCY MEDICINE

## 2020-10-26 PROCEDURE — 81001 URINALYSIS AUTO W/SCOPE: CPT | Performed by: EMERGENCY MEDICINE

## 2020-10-26 PROCEDURE — 80053 COMPREHEN METABOLIC PANEL: CPT | Performed by: EMERGENCY MEDICINE

## 2020-10-26 PROCEDURE — 71275 CT ANGIOGRAPHY CHEST: CPT

## 2020-10-26 PROCEDURE — 99283 EMERGENCY DEPT VISIT LOW MDM: CPT

## 2020-10-26 PROCEDURE — 25010000002 KETOROLAC TROMETHAMINE PER 15 MG: Performed by: EMERGENCY MEDICINE

## 2020-10-26 PROCEDURE — 36415 COLL VENOUS BLD VENIPUNCTURE: CPT

## 2020-10-26 PROCEDURE — 85379 FIBRIN DEGRADATION QUANT: CPT | Performed by: EMERGENCY MEDICINE

## 2020-10-26 PROCEDURE — 96374 THER/PROPH/DIAG INJ IV PUSH: CPT

## 2020-10-26 PROCEDURE — 0 IOPAMIDOL PER 1 ML: Performed by: EMERGENCY MEDICINE

## 2020-10-26 PROCEDURE — 84484 ASSAY OF TROPONIN QUANT: CPT | Performed by: EMERGENCY MEDICINE

## 2020-10-26 PROCEDURE — 71046 X-RAY EXAM CHEST 2 VIEWS: CPT

## 2020-10-26 PROCEDURE — 85025 COMPLETE CBC W/AUTO DIFF WBC: CPT | Performed by: EMERGENCY MEDICINE

## 2020-10-26 RX ORDER — KETOROLAC TROMETHAMINE 15 MG/ML
15 INJECTION, SOLUTION INTRAMUSCULAR; INTRAVENOUS ONCE
Status: COMPLETED | OUTPATIENT
Start: 2020-10-26 | End: 2020-10-26

## 2020-10-26 RX ORDER — IBUPROFEN 600 MG/1
600 TABLET ORAL EVERY 6 HOURS PRN
Qty: 24 TABLET | Refills: 0 | Status: SHIPPED | OUTPATIENT
Start: 2020-10-26 | End: 2020-11-20

## 2020-10-26 RX ORDER — SODIUM CHLORIDE 0.9 % (FLUSH) 0.9 %
10 SYRINGE (ML) INJECTION AS NEEDED
Status: DISCONTINUED | OUTPATIENT
Start: 2020-10-26 | End: 2020-10-26 | Stop reason: HOSPADM

## 2020-10-26 RX ADMIN — IOPAMIDOL 100 ML: 755 INJECTION, SOLUTION INTRAVENOUS at 20:10

## 2020-10-26 RX ADMIN — KETOROLAC TROMETHAMINE 15 MG: 15 INJECTION, SOLUTION INTRAMUSCULAR; INTRAVENOUS at 17:41

## 2020-10-26 NOTE — ED PROVIDER NOTES
EMERGENCY DEPARTMENT ENCOUNTER    Room Number:  23/23  Date of encounter:  10/27/2020  PCP: Desiree Rust APRN  Historian: Patient      HPI:  Chief Complaint: Left-sided rib pain  A complete HPI/ROS/PMH/PSH/SH/FH are unobtainable due to: N/A    Context: Star Rollins is a 30 y.o. female who presents to the ED c/o left-sided rib pain which was present when she woke up this morning.  It is of the left upper lateral chest, and it is sharp.  Is worse with deep breathing.  It is not worse with palpation returning.  It seems to be better when she is sitting upright as opposed when she is supine.  She has had no cough or congestion.  No fevers or chills.  No nausea, vomiting or diarrhea.  No urinary difficulties.  No prior history of similar symptoms.      The patient was placed in a mask in triage, hand hygiene was performed before and after my interaction with the patient.  I wore a mask, safety glasses and gloves during my entire interaction with the patient.    PAST MEDICAL HISTORY  Active Ambulatory Problems     Diagnosis Date Noted   • Anxiety and depression 09/01/2017   • Hidradenitis suppurativa 09/01/2017   • Obesity 09/01/2017   • Pseudotumor cerebri 07/06/2016   • General medical exam 02/29/2016     Resolved Ambulatory Problems     Diagnosis Date Noted   • No Resolved Ambulatory Problems     Past Medical History:   Diagnosis Date   • Anxiety    • Hidradenitis          PAST SURGICAL HISTORY  History reviewed. No pertinent surgical history.      FAMILY HISTORY  Family History   Problem Relation Age of Onset   • Diabetes Father    • COPD Father    • Hypertension Father    • Developmental Disability Maternal Grandmother    • COPD Maternal Grandmother    • Developmental Disability Maternal Grandfather    • Hypertension Maternal Grandfather    • Developmental Disability Paternal Grandmother    • Developmental Disability Paternal Grandfather          SOCIAL HISTORY  Social History     Socioeconomic History   •  Marital status: Single     Spouse name: Not on file   • Number of children: Not on file   • Years of education: Not on file   • Highest education level: Not on file   Tobacco Use   • Smoking status: Never Smoker   • Smokeless tobacco: Never Used   Substance and Sexual Activity   • Alcohol use: Yes     Comment: socially   • Drug use: No         ALLERGIES  Patient has no known allergies.        REVIEW OF SYSTEMS  Review of Systems   Constitutional: Negative for fever.   HENT: Negative for sore throat.    Eyes: Negative.    Respiratory: Negative for cough and shortness of breath.    Cardiovascular: Positive for chest pain.   Gastrointestinal: Negative for abdominal pain, diarrhea and vomiting.   Genitourinary: Negative for dysuria.   Musculoskeletal: Negative for neck pain.   Skin: Negative for rash.   Allergic/Immunologic: Negative.    Neurological: Negative for weakness, numbness and headaches.   Hematological: Negative.    Psychiatric/Behavioral: Negative.    All other systems reviewed and are negative.       All systems reviewed and negative except for those discussed in HPI.       PHYSICAL EXAM    I have reviewed the triage vital signs and nursing notes.    ED Triage Vitals   Temp Heart Rate Resp BP SpO2   10/26/20 1453 10/26/20 1453 10/26/20 1453 10/26/20 1548 10/26/20 1453   98.8 °F (37.1 °C) (!) 123 16 148/97 100 %      Temp src Heart Rate Source Patient Position BP Location FiO2 (%)   10/26/20 1453 10/26/20 1453 -- -- --   Tympanic Monitor          Physical Exam   Constitutional: Pt. is oriented to person, place, and time and well-developed, well-nourished, and in no distress. No distress.   HENT: Normocephalic and atraumatic,  EOM are normal. Pupils are equal, round, and reactive to light. Oropharynx moist/nonerythematous.  Neck: Normal range of motion. Neck supple. No JVD present. No tracheal deviation present. No thyromegaly present.   Cardiovascular: Normal rate, regular rhythm and normal heart sounds.  Exam reveals no gallop and no friction rub.   No murmur heard.  Pulmonary/Chest: Effort normal and breath sounds normal. No stridor. No respiratory distress. No wheezes, no rales.   Abdominal: Soft, obese. Bowel sounds are normal. No distension. There is no tenderness. There is no rebound and no guarding.   Musculoskeletal: Normal range of motion. No edema, tenderness or deformity.   Neurological: Pt. is alert and oriented to person, place, and time. Pt. has normal sensation and normal strength. No cranial nerve deficit. GCS score is 15.   Skin: Skin is warm and dry. No rash noted. Pt. is not diaphoretic. No erythema.   Psychiatric: Mood, affect and judgment normal.   Nursing note and vitals reviewed.        LAB RESULTS  Recent Results (from the past 24 hour(s))   Urinalysis With Microscopic If Indicated (No Culture) - Urine, Clean Catch    Collection Time: 10/26/20  5:15 PM    Specimen: Urine, Clean Catch   Result Value Ref Range    Color, UA Yellow Yellow, Straw    Appearance, UA Clear Clear    pH, UA 5.5 5.0 - 8.0    Specific Gravity, UA 1.024 1.005 - 1.030    Glucose, UA Negative Negative    Ketones, UA Negative Negative    Bilirubin, UA Negative Negative    Blood, UA Negative Negative    Protein, UA Trace (A) Negative    Leuk Esterase, UA Trace (A) Negative    Nitrite, UA Negative Negative    Urobilinogen, UA 1.0 E.U./dL 0.2 - 1.0 E.U./dL   Urinalysis, Microscopic Only - Urine, Clean Catch    Collection Time: 10/26/20  5:15 PM    Specimen: Urine, Clean Catch   Result Value Ref Range    RBC, UA 0-2 None Seen, 0-2 /HPF    WBC, UA 3-5 (A) None Seen, 0-2 /HPF    Bacteria, UA None Seen None Seen /HPF    Squamous Epithelial Cells, UA 3-6 (A) None Seen, 0-2 /HPF    Hyaline Casts, UA 0-2 None Seen /LPF    Methodology Automated Microscopy    Comprehensive Metabolic Panel    Collection Time: 10/26/20  5:55 PM    Specimen: Blood   Result Value Ref Range    Glucose 96 65 - 99 mg/dL    BUN 22 (H) 6 - 20 mg/dL    Creatinine  0.84 0.57 - 1.00 mg/dL    Sodium 137 136 - 145 mmol/L    Potassium 3.8 3.5 - 5.2 mmol/L    Chloride 105 98 - 107 mmol/L    CO2 21.6 (L) 22.0 - 29.0 mmol/L    Calcium 9.4 8.6 - 10.5 mg/dL    Total Protein 8.0 6.0 - 8.5 g/dL    Albumin 4.50 3.50 - 5.20 g/dL    ALT (SGPT) 13 1 - 33 U/L    AST (SGOT) 12 1 - 32 U/L    Alkaline Phosphatase 51 39 - 117 U/L    Total Bilirubin 0.3 0.0 - 1.2 mg/dL    eGFR Non African Amer 80 >60 mL/min/1.73    Globulin 3.5 gm/dL    A/G Ratio 1.3 g/dL    BUN/Creatinine Ratio 26.2 (H) 7.0 - 25.0    Anion Gap 10.4 5.0 - 15.0 mmol/L   D-dimer, Quantitative    Collection Time: 10/26/20  5:55 PM    Specimen: Blood   Result Value Ref Range    D-Dimer, Quantitative 0.62 (H) 0.00 - 0.49 MCGFEU/mL   Troponin    Collection Time: 10/26/20  5:55 PM    Specimen: Blood   Result Value Ref Range    Troponin T <0.010 0.000 - 0.030 ng/mL   Lipase    Collection Time: 10/26/20  5:55 PM    Specimen: Blood   Result Value Ref Range    Lipase 16 13 - 60 U/L   CBC Auto Differential    Collection Time: 10/26/20  6:38 PM    Specimen: Blood   Result Value Ref Range    WBC 10.38 3.40 - 10.80 10*3/mm3    RBC 4.33 3.77 - 5.28 10*6/mm3    Hemoglobin 13.5 12.0 - 15.9 g/dL    Hematocrit 39.9 34.0 - 46.6 %    MCV 92.1 79.0 - 97.0 fL    MCH 31.2 26.6 - 33.0 pg    MCHC 33.8 31.5 - 35.7 g/dL    RDW 11.6 (L) 12.3 - 15.4 %    RDW-SD 39.2 37.0 - 54.0 fl    MPV 9.5 6.0 - 12.0 fL    Platelets 306 140 - 450 10*3/mm3    Neutrophil % 70.6 42.7 - 76.0 %    Lymphocyte % 19.4 (L) 19.6 - 45.3 %    Monocyte % 7.5 5.0 - 12.0 %    Eosinophil % 1.6 0.3 - 6.2 %    Basophil % 0.5 0.0 - 1.5 %    Immature Grans % 0.4 0.0 - 0.5 %    Neutrophils, Absolute 7.33 (H) 1.70 - 7.00 10*3/mm3    Lymphocytes, Absolute 2.01 0.70 - 3.10 10*3/mm3    Monocytes, Absolute 0.78 0.10 - 0.90 10*3/mm3    Eosinophils, Absolute 0.17 0.00 - 0.40 10*3/mm3    Basophils, Absolute 0.05 0.00 - 0.20 10*3/mm3    Immature Grans, Absolute 0.04 0.00 - 0.05 10*3/mm3    nRBC 0.0 0.0 -  0.2 /100 WBC       Ordered the above labs and independently reviewed the results.        RADIOLOGY  Xr Chest 2 View    Result Date: 10/26/2020  TWO-VIEW CHEST  HISTORY: Left-sided chest pain.  FINDINGS: The lungs are moderately well-expanded and clear and the heart and hilar structures are normal. There is no acute disease or change from 10/06/2013.  This report was finalized on 10/26/2020 7:27 PM by Dr. Jose Vela M.D.      Ct Angiogram Chest    Result Date: 10/26/2020  CT ANGIOGRAPHY OF THE CHEST WITH INTRAVENOUS CONTRAST AND 3-D RECONSTRUCTIONS  HISTORY: Left-sided chest pain. Elevated d-dimer.  The CT scan was performed as an emergency procedure with CT angiography protocol using intravenous contrast and 3-D reconstructions and demonstrates the followin. The pulmonary arteries are well-opacified and there is no evidence of pulmonary embolus. The thoracic aorta shows no evidence of aneurysm or dissection. 2. The lungs are well-expanded and clear. There is no mediastinal or hilar or axillary adenopathy. There is no pericardial effusion. 3. The CT images through the upper liver and spleen and both adrenal glands are unremarkable. There appears to be a large gallstone in the partially visualized gallbladder.      Radiation dose reduction techniques were utilized, including automated exposure control and exposure modulation based on body size.  This report was finalized on 10/26/2020 9:00 PM by Dr. Jose Vela M.D.        I ordered the above noted radiological studies. Reviewed by me and discussed with radiologist.  See dictation for official radiology interpretation.      PROCEDURES    Procedures      MEDICATIONS GIVEN IN ER    Medications   ketorolac (TORADOL) injection 15 mg (15 mg Intravenous Given 10/26/20 1741)   iopamidol (ISOVUE-370) 76 % injection 100 mL (100 mL Intravenous Given by Other 10/26/20 2010)         PROGRESS, DATA ANALYSIS, CONSULTS, AND MEDICAL DECISION MAKING    Any/all labs have  been independently reviewed by me.  Any/all radiology studies have been reviewed by me and discussed with radiologist dictating the report.   EKG's independently viewed and interpreted by me.  Discussion below represents my analysis of pertinent findings related to patient's condition, differential diagnosis, treatment plan and final disposition.      ED Course as of Oct 27 1059   Mon Oct 26, 2020   1751 EKG performed at 1733 and interpreted by me shows sinus tachycardia with a heart of 105 bpm.  Parables, QRS complexes and ST-T segments are unremarkable.  There are no prior EKGs available for comparison.    [WC]   1807 Chest x-ray is unremarkable.  See dictated report for official interpretation.    [WC]   1821 D-Dimer, Quant(!): 0.62 [WC]   1900 Care turned over to oncoming MD pending disposition (Dr. Luo).    [WC]      ED Course User Index  [WC] Erickson Wilson MD       AS OF 10:59 EDT VITALS:    BP - 128/82  HR - 92  TEMP - 98.8 °F (37.1 °C) (Tympanic)  02 SATS - 98%        DIAGNOSIS  Final diagnoses:   Pleurisy         DISPOSITION  Pending           Erickson Wilson MD  10/27/20 1100

## 2020-10-26 NOTE — ED NOTES
Patient was placed in face mask in first look.  Patient was wearing a face mask throughout our encounter.  I wore protective eye protection throughout the encounter.  Hand hygiene was performed before and after patient encounter.       Patient c/o Left Side pain x a few hours., denies any N/V/D.     Tomas Bonds RN  10/26/20 7218

## 2020-10-27 ENCOUNTER — TELEPHONE (OUTPATIENT)
Dept: FAMILY MEDICINE CLINIC | Facility: CLINIC | Age: 30
End: 2020-10-27

## 2020-10-27 NOTE — DISCHARGE INSTRUCTIONS
You are advised to follow closely with Desiree mccabe or primary physician of your choice in 2-3 days for recheck, final results of lab work and imaging testing, and further testing/treatment as needed.    Drink plenty of fluids.  Ice and gentle stretching    Please return to the emergency department immediately with chest pain different than usual for you, shortness of air, abdominal pain, persistent vomiting/fever, blood in emesis or stool, lightheadedness/fainting, problems with speech, one sided weakness/numbness, new incontinence, problems with vision,  or for worsening of symptoms or other concerns.

## 2020-10-27 NOTE — ED PROVIDER NOTES
Discussed with Dr. Vela, patient with CTA chest negative for PE or other acute thoracic findings with incidental finding of large gallstone.    As previously discussed with Dr. Wilson, he request discharge with outpatient follow-up with diagnosis of pleurisy.\    2100  Patient reports her pain is improved with medication in the ER, heart rate in the 70s at the time of my exam no respiratory distress respiratory rate approximately 16.  Patient and family at bedside voiced understanding of negative CT results and my recommendation for follow with cardiology as needed for persistent symptoms given her strong family history of coronary artery disease.  Patient voices understanding that she has gallstones noted on her imaging today and voices understanding of need to follow closely with her primary care provider for recheck and further testing, treatment as needed.       Jennifer Luo MD  10/26/20 8050

## 2020-10-28 ENCOUNTER — FLU SHOT (OUTPATIENT)
Dept: FAMILY MEDICINE CLINIC | Facility: CLINIC | Age: 30
End: 2020-10-28

## 2020-10-28 DIAGNOSIS — Z23 NEED FOR INFLUENZA VACCINATION: ICD-10-CM

## 2020-10-28 PROCEDURE — 90686 IIV4 VACC NO PRSV 0.5 ML IM: CPT | Performed by: NURSE PRACTITIONER

## 2020-10-28 PROCEDURE — 90471 IMMUNIZATION ADMIN: CPT | Performed by: NURSE PRACTITIONER

## 2020-11-20 ENCOUNTER — OFFICE VISIT (OUTPATIENT)
Dept: FAMILY MEDICINE CLINIC | Facility: CLINIC | Age: 30
End: 2020-11-20

## 2020-11-20 VITALS
TEMPERATURE: 97.4 F | OXYGEN SATURATION: 100 % | WEIGHT: 262 LBS | BODY MASS INDEX: 48.21 KG/M2 | DIASTOLIC BLOOD PRESSURE: 74 MMHG | HEIGHT: 62 IN | SYSTOLIC BLOOD PRESSURE: 130 MMHG | HEART RATE: 60 BPM

## 2020-11-20 DIAGNOSIS — Z87.09 HX OF PLEURISY: Primary | ICD-10-CM

## 2020-11-20 DIAGNOSIS — Z91.09 ENVIRONMENTAL ALLERGIES: ICD-10-CM

## 2020-11-20 PROCEDURE — 99213 OFFICE O/P EST LOW 20 MIN: CPT | Performed by: NURSE PRACTITIONER

## 2020-11-20 NOTE — PROGRESS NOTES
"Subjective   Star Rollins is a 30 y.o. female who presents for a follow up after being treated in ER on 10/26/20 for pleurisy.     History of Present Illness   Never had pleurisy, now improved. Took a few days to settle after seen in ED. Once home would only hurt after took too deep a breath. Has chronic drainage. Taking zyrtec. Allergies usually seasonal, but worse this year. No asthma history. Just started some flonase.    Review of Systems   Constitutional: Negative for chills and fever.   HENT: Positive for postnasal drip and sore throat. Negative for congestion.    Respiratory: Negative for cough.    Cardiovascular: Negative.    Allergic/Immunologic: Positive for environmental allergies.   Neurological: Negative for headache.     /74   Pulse 60   Temp 97.4 °F (36.3 °C) (Infrared)   Ht 157.5 cm (62\")   Wt 119 kg (262 lb)   SpO2 100%   BMI 47.92 kg/m²     Objective   Physical Exam  Constitutional:       Appearance: She is well-developed. She is not diaphoretic.   HENT:      Head: Normocephalic and atraumatic.      Right Ear: A middle ear effusion is present.      Left Ear: Tympanic membrane normal.      Nose: No congestion or rhinorrhea.   Neck:      Musculoskeletal: Normal range of motion and neck supple.      Thyroid: No thyromegaly.   Cardiovascular:      Rate and Rhythm: Normal rate and regular rhythm.      Pulses:           Carotid pulses are 2+ on the right side and 2+ on the left side.     Heart sounds: Normal heart sounds.   Pulmonary:      Effort: Pulmonary effort is normal.      Breath sounds: Normal breath sounds.   Lymphadenopathy:      Cervical: No cervical adenopathy.   Psychiatric:         Behavior: Behavior normal.         Thought Content: Thought content normal.         Judgment: Judgment normal.       Assessment/Plan   Problems Addressed this Visit     None      Visit Diagnoses     Hx of pleurisy    -  Primary    Environmental allergies          Diagnoses       Codes Comments    " Hx of pleurisy    -  Primary ICD-10-CM: Z87.09  ICD-9-CM: V12.69     Environmental allergies     ICD-10-CM: Z91.09  ICD-9-CM: V15.09       Hx pleurisy--Discussed risk and incidence--NSAIDS standard treatment--If SOA   --seek evaluation  Environmental allergies--add flonase as described    ER CT--negative for PE

## 2021-01-05 ENCOUNTER — TELEPHONE (OUTPATIENT)
Dept: FAMILY MEDICINE CLINIC | Facility: CLINIC | Age: 31
End: 2021-01-05

## 2021-01-05 DIAGNOSIS — L73.2 HIDRADENITIS SUPPURATIVA: Primary | ICD-10-CM

## 2021-01-05 NOTE — TELEPHONE ENCOUNTER
Caller: Star Rollins    Relationship: Self    Best call back number:[phone]    What orders are you requesting (i.e. lab or imaging): REFERRAL FOR DERMATOLOGY NEEDED BEFORE THEY WILL MAKE HER APPOINTMENT.    In what timeframe would the patient need to come in: 1/6/2021    Where will you receive your lab/imaging services: [facility] OFF[facility]  PHONE NUMBER: [phone]

## 2021-01-06 NOTE — TELEPHONE ENCOUNTER
Patient states the referral is for the same reason as before (Hidradenitis suppurativa) and just needs to be updated

## 2021-04-16 ENCOUNTER — BULK ORDERING (OUTPATIENT)
Dept: CASE MANAGEMENT | Facility: OTHER | Age: 31
End: 2021-04-16

## 2021-04-16 DIAGNOSIS — Z23 IMMUNIZATION DUE: ICD-10-CM

## 2021-10-01 ENCOUNTER — OFFICE VISIT (OUTPATIENT)
Dept: FAMILY MEDICINE CLINIC | Facility: CLINIC | Age: 31
End: 2021-10-01

## 2021-10-01 VITALS
HEIGHT: 62 IN | DIASTOLIC BLOOD PRESSURE: 74 MMHG | OXYGEN SATURATION: 100 % | WEIGHT: 263 LBS | BODY MASS INDEX: 48.4 KG/M2 | SYSTOLIC BLOOD PRESSURE: 118 MMHG | HEART RATE: 65 BPM

## 2021-10-01 DIAGNOSIS — H47.10 PAPILLEDEMA: ICD-10-CM

## 2021-10-01 DIAGNOSIS — E66.01 CLASS 2 SEVERE OBESITY DUE TO EXCESS CALORIES WITH SERIOUS COMORBIDITY AND BODY MASS INDEX (BMI) OF 38.0 TO 38.9 IN ADULT (HCC): Primary | ICD-10-CM

## 2021-10-01 DIAGNOSIS — F41.9 ANXIETY: ICD-10-CM

## 2021-10-01 PROCEDURE — 99214 OFFICE O/P EST MOD 30 MIN: CPT | Performed by: NURSE PRACTITIONER

## 2021-10-01 RX ORDER — SPIRONOLACTONE 100 MG/1
100 TABLET, FILM COATED ORAL DAILY
COMMUNITY

## 2021-10-01 RX ORDER — BUPROPION HYDROCHLORIDE 150 MG/1
150 TABLET ORAL DAILY
Qty: 30 TABLET | Refills: 0 | Status: SHIPPED | OUTPATIENT
Start: 2021-10-01 | End: 2021-10-19

## 2021-10-01 RX ORDER — PROPRANOLOL HYDROCHLORIDE 20 MG/1
20 TABLET ORAL 2 TIMES DAILY PRN
Qty: 60 TABLET | Refills: 0 | Status: SHIPPED | OUTPATIENT
Start: 2021-10-01 | End: 2023-02-27

## 2021-10-01 RX ORDER — DOXYCYCLINE HYCLATE 100 MG
TABLET ORAL
COMMUNITY
Start: 2021-09-29 | End: 2021-10-19

## 2021-10-01 RX ORDER — CLINDAMYCIN PHOSPHATE 10 MG/G
GEL TOPICAL
COMMUNITY
Start: 2021-09-04

## 2021-10-19 ENCOUNTER — OFFICE VISIT (OUTPATIENT)
Dept: FAMILY MEDICINE CLINIC | Facility: CLINIC | Age: 31
End: 2021-10-19

## 2021-10-19 VITALS
TEMPERATURE: 97.1 F | BODY MASS INDEX: 47.84 KG/M2 | SYSTOLIC BLOOD PRESSURE: 122 MMHG | DIASTOLIC BLOOD PRESSURE: 76 MMHG | WEIGHT: 260 LBS | HEIGHT: 62 IN | HEART RATE: 65 BPM | OXYGEN SATURATION: 99 %

## 2021-10-19 DIAGNOSIS — F41.9 ANXIETY: Primary | ICD-10-CM

## 2021-10-19 DIAGNOSIS — Z23 IMMUNIZATION DUE: ICD-10-CM

## 2021-10-19 PROCEDURE — 99213 OFFICE O/P EST LOW 20 MIN: CPT | Performed by: NURSE PRACTITIONER

## 2021-10-19 PROCEDURE — 90471 IMMUNIZATION ADMIN: CPT | Performed by: NURSE PRACTITIONER

## 2021-10-19 PROCEDURE — 90686 IIV4 VACC NO PRSV 0.5 ML IM: CPT | Performed by: NURSE PRACTITIONER

## 2021-10-19 RX ORDER — BUPROPION HYDROCHLORIDE 75 MG/1
75 TABLET ORAL DAILY
Qty: 90 TABLET | Refills: 0 | Status: SHIPPED | OUTPATIENT
Start: 2021-10-19 | End: 2023-02-27

## 2021-10-19 NOTE — PROGRESS NOTES
"Subjective   Star PACHECO Rollins is a 31 y.o. female who presents c/o increase in anxiety that started last night. Thinks may be due to bupropion.     History of Present Illness   Has been taking mucinex D, couldn't breathe at all 4 days ago. COVID test Friday and negative. Only slept 2 hours last night. Sensed heart racing. Was doing pretty good until last night. Taking 730 in evening and more difficult to sleep of late. Generally doing ok with stress at work. Thinks bupropion is helping this. Taking in evening as worried would be sleepy during the day  The following portions of the patient's history were reviewed and updated as appropriate: allergies, current medications, past family history, past medical history, past social history, past surgical history and problem list.    Review of Systems   Constitutional: Positive for fatigue. Negative for activity change, fever and unexpected weight change.   HENT: Negative.    Respiratory: Negative.    Cardiovascular: Negative.    Gastrointestinal: Negative.  Negative for abdominal pain.   Endocrine: Negative for cold intolerance, heat intolerance, polydipsia, polyphagia and polyuria.   Neurological: Negative for seizures, light-headedness and headaches.   Psychiatric/Behavioral: Positive for sleep disturbance. Negative for agitation, confusion, decreased concentration, dysphoric mood, hallucinations, self-injury and suicidal ideas. The patient is nervous/anxious. The patient is not hyperactive.      /76   Pulse 65   Temp 97.1 °F (36.2 °C) (Infrared)   Ht 157.5 cm (62\")   Wt 118 kg (260 lb)   SpO2 99%   BMI 47.55 kg/m²     Objective   Physical Exam  Vitals and nursing note reviewed.   Constitutional:       General: She is not in acute distress.     Appearance: She is well-developed. She is not diaphoretic.   Pulmonary:      Effort: Pulmonary effort is normal.   Skin:     General: Skin is dry.   Neurological:      Mental Status: She is alert and oriented to person, " place, and time.   Psychiatric:         Attention and Perception: Attention and perception normal.         Mood and Affect: Mood and affect normal.         Behavior: Behavior normal.         Thought Content: Thought content normal.         Judgment: Judgment normal.       Assessment/Plan   Problems Addressed this Visit     None      Visit Diagnoses     Anxiety    -  Primary    Relevant Medications    buPROPion (WELLBUTRIN) 75 MG tablet    Immunization due        Relevant Orders    FluLaval/Fluarix/Fluzone >6 Months (4834-0620) (Completed)      Diagnoses       Codes Comments    Anxiety    -  Primary ICD-10-CM: F41.9  ICD-9-CM: 300.00     Immunization due     ICD-10-CM: Z23  ICD-9-CM: V05.9         Anxiety--short acting can sometimes interfere less with sleep, will rotate to SA bupropion and assess response. 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    This document is intended for medical expert use only. Reading of this document by patients and/or patient's family without participating medical staff guidance may result in misinterpretation and unintended morbidity.  Any interpretation of such data is the responsibility of the patient and/or family member responsible for the patient in concert with their primary or specialist providers, not to be left for sources of online searches such as [employer], [employer] or similar queries. Relying on these approaches to knowledge may result in misinterpretation, misguided goals of care and even death should patients or family members try recommendations outside of the realm of professional medical care in a supervised way.    Please allow 3-5 business days for recommendations based on new results    Go to the ER for any possible lifethreatening symptoms such as chest pain or shortness of air.     I personally spent 21 minutes reviewing the chart before the visit, time with the  patient, and time documenting the visit.

## 2021-11-02 ENCOUNTER — OFFICE VISIT (OUTPATIENT)
Dept: FAMILY MEDICINE CLINIC | Facility: CLINIC | Age: 31
End: 2021-11-02

## 2021-11-02 VITALS
DIASTOLIC BLOOD PRESSURE: 76 MMHG | OXYGEN SATURATION: 99 % | TEMPERATURE: 97.4 F | HEIGHT: 62 IN | WEIGHT: 261 LBS | BODY MASS INDEX: 48.03 KG/M2 | SYSTOLIC BLOOD PRESSURE: 124 MMHG | HEART RATE: 71 BPM

## 2021-11-02 DIAGNOSIS — R63.5 WEIGHT GAIN, ABNORMAL: ICD-10-CM

## 2021-11-02 DIAGNOSIS — F41.9 ANXIETY AND DEPRESSION: Primary | ICD-10-CM

## 2021-11-02 DIAGNOSIS — F32.A ANXIETY AND DEPRESSION: Primary | ICD-10-CM

## 2021-11-02 DIAGNOSIS — Z13.220 LIPID SCREENING: ICD-10-CM

## 2021-11-02 DIAGNOSIS — E66.01 CLASS 3 SEVERE OBESITY DUE TO EXCESS CALORIES WITH SERIOUS COMORBIDITY AND BODY MASS INDEX (BMI) OF 45.0 TO 49.9 IN ADULT (HCC): ICD-10-CM

## 2021-11-02 DIAGNOSIS — Z11.59 NEED FOR HEPATITIS C SCREENING TEST: ICD-10-CM

## 2021-11-02 PROCEDURE — 99213 OFFICE O/P EST LOW 20 MIN: CPT | Performed by: NURSE PRACTITIONER

## 2021-11-02 NOTE — PROGRESS NOTES
"Subjective   Star PACHECO Rollins is a 31 y.o. female who presents for a follow up on anxiety.    History of Present Illness   After last visit couldn't stop crying, so did not start bupropion SA. Her mood was improved on bupropion XR, but just could not sleep  Less crying since that day. Not really struggling with crying  In 2017 was 241 lbs. No pregnancies since then. Worried about weight gain and how to address  The following portions of the patient's history were reviewed and updated as appropriate: allergies, current medications, past family history, past medical history, past social history, past surgical history and problem list.    Review of Systems   Constitutional: Positive for fatigue and unexpected weight change. Negative for fever.   Respiratory: Negative.    Cardiovascular: Negative.    Neurological: Negative for headaches.   Psychiatric/Behavioral: Positive for dysphoric mood and sleep disturbance. Negative for agitation, self-injury and suicidal ideas. The patient is nervous/anxious.      /76   Pulse 71   Temp 97.4 °F (36.3 °C) (Infrared)   Ht 157.5 cm (62\")   Wt 118 kg (261 lb)   SpO2 99%   BMI 47.74 kg/m²     Objective   Physical Exam    Assessment/Plan   Problems Addressed this Visit        Endocrine and Metabolic    Obesity    Relevant Orders    TSH    T4, Free    Insulin, Random       Mental Health    Anxiety and depression - Primary      Other Visit Diagnoses     Weight gain, abnormal        Relevant Orders    CBC (No Diff)    Comprehensive Metabolic Panel    TSH    T4, Free    Insulin, Random    Lipid screening        Relevant Orders    Lipid Panel    Need for hepatitis C screening test        Relevant Orders    Hepatitis C Antibody      Diagnoses       Codes Comments    Anxiety and depression    -  Primary ICD-10-CM: F41.9, F32.A  ICD-9-CM: 300.00, 311     Weight gain, abnormal     ICD-10-CM: R63.5  ICD-9-CM: 783.1     Class 3 severe obesity due to excess calories with serious " comorbidity and body mass index (BMI) of 45.0 to 49.9 in adult (HCC)     ICD-10-CM: E66.01, Z68.42  ICD-9-CM: 278.01, V85.42     Lipid screening     ICD-10-CM: Z13.220  ICD-9-CM: V77.91     Need for hepatitis C screening test     ICD-10-CM: Z11.59  ICD-9-CM: V73.89         Anxiety--would start proposed SA bupropion. FU if mood not responding or side effects 1 month  Weight gain--Would recheck labs, but normal in 2017  Obesity--work on diet quality, active job, consider adding weights/resistance training  Lipid screening  Screen Hep C--population recommendation    This document is intended for medical expert use only. Reading of this document by patients and/or patient's family without participating medical staff guidance may result in misinterpretation and unintended morbidity.  Any interpretation of such data is the responsibility of the patient and/or family member responsible for the patient in concert with their primary or specialist providers, not to be left for sources of online searches such as [employer], [employer] or similar queries. Relying on these approaches to knowledge may result in misinterpretation, misguided goals of care and even death should patients or family members try recommendations outside of the realm of professional medical care in a supervised way.    Please allow 3-5 business days for recommendations based on new results    Go to the ER for any possible lifethreatening symptoms such as chest pain or shortness of air.     I personally spent 20 minutes reviewing the chart before the visit, time with the patient, and time documenting the visit.

## 2021-11-18 LAB
ALBUMIN SERPL-MCNC: 4.3 G/DL (ref 3.9–5)
ALBUMIN/GLOB SERPL: 1.2 {RATIO} (ref 1.2–2.2)
ALP SERPL-CCNC: 44 IU/L (ref 44–121)
ALT SERPL-CCNC: 14 IU/L (ref 0–32)
AST SERPL-CCNC: 21 IU/L (ref 0–40)
BILIRUB SERPL-MCNC: 0.4 MG/DL (ref 0–1.2)
BUN SERPL-MCNC: 21 MG/DL (ref 6–20)
BUN/CREAT SERPL: 23 (ref 9–23)
CALCIUM SERPL-MCNC: 9.5 MG/DL (ref 8.7–10.2)
CHLORIDE SERPL-SCNC: 103 MMOL/L (ref 96–106)
CHOLEST SERPL-MCNC: 163 MG/DL (ref 100–199)
CO2 SERPL-SCNC: 21 MMOL/L (ref 20–29)
CREAT SERPL-MCNC: 0.93 MG/DL (ref 0.57–1)
ERYTHROCYTE [DISTWIDTH] IN BLOOD BY AUTOMATED COUNT: 12 % (ref 11.7–15.4)
GLOBULIN SER CALC-MCNC: 3.5 G/DL (ref 1.5–4.5)
GLUCOSE SERPL-MCNC: 90 MG/DL (ref 65–99)
HCT VFR BLD AUTO: 41.7 % (ref 34–46.6)
HCV AB S/CO SERPL IA: <0.1 S/CO RATIO (ref 0–0.9)
HDLC SERPL-MCNC: 38 MG/DL
HGB BLD-MCNC: 13.6 G/DL (ref 11.1–15.9)
INSULIN SERPL-ACNC: 15 UIU/ML
LDLC SERPL CALC-MCNC: 106 MG/DL (ref 0–99)
MCH RBC QN AUTO: 30.8 PG (ref 26.6–33)
MCHC RBC AUTO-ENTMCNC: 32.6 G/DL (ref 31.5–35.7)
MCV RBC AUTO: 95 FL (ref 79–97)
PLATELET # BLD AUTO: 314 X10E3/UL (ref 150–450)
POTASSIUM SERPL-SCNC: 4.8 MMOL/L (ref 3.5–5.2)
PROT SERPL-MCNC: 7.8 G/DL (ref 6–8.5)
RBC # BLD AUTO: 4.41 X10E6/UL (ref 3.77–5.28)
SODIUM SERPL-SCNC: 139 MMOL/L (ref 134–144)
T4 FREE SERPL-MCNC: 1.12 NG/DL (ref 0.82–1.77)
TRIGL SERPL-MCNC: 100 MG/DL (ref 0–149)
TSH SERPL DL<=0.005 MIU/L-ACNC: 2.11 UIU/ML (ref 0.45–4.5)
VLDLC SERPL CALC-MCNC: 19 MG/DL (ref 5–40)
WBC # BLD AUTO: 6.9 X10E3/UL (ref 3.4–10.8)

## 2023-02-23 ENCOUNTER — TELEPHONE (OUTPATIENT)
Dept: FAMILY MEDICINE CLINIC | Facility: CLINIC | Age: 33
End: 2023-02-23
Payer: COMMERCIAL

## 2023-02-23 NOTE — TELEPHONE ENCOUNTER
OK FOR HUB TO READ:    Called pt to advise pt has an upcoming appt with Jeramy SAUNDERS on 2/27/23. Welcome to keep this appt but wanted to let you know that Jeramy is leaving this practice and [facility] eff 3/10/23.      Pt can cancel if they wish to seek a new PCP. Will be receiving a letter soon with options for other PCP's. We hope to hire a new provider quickly but not sure when that is happening.

## 2023-02-27 ENCOUNTER — OFFICE VISIT (OUTPATIENT)
Dept: FAMILY MEDICINE CLINIC | Facility: CLINIC | Age: 33
End: 2023-02-27
Payer: COMMERCIAL

## 2023-02-27 VITALS
WEIGHT: 263 LBS | OXYGEN SATURATION: 94 % | TEMPERATURE: 97.1 F | HEIGHT: 62 IN | DIASTOLIC BLOOD PRESSURE: 70 MMHG | BODY MASS INDEX: 48.4 KG/M2 | SYSTOLIC BLOOD PRESSURE: 120 MMHG | RESPIRATION RATE: 16 BRPM | HEART RATE: 78 BPM

## 2023-02-27 DIAGNOSIS — Z00.00 HEALTHCARE MAINTENANCE: Primary | ICD-10-CM

## 2023-02-27 DIAGNOSIS — R09.82 POST-NASAL DRAINAGE: ICD-10-CM

## 2023-02-27 DIAGNOSIS — J34.89 RHINORRHEA: ICD-10-CM

## 2023-02-27 PROCEDURE — 99395 PREV VISIT EST AGE 18-39: CPT

## 2023-02-27 PROCEDURE — 3008F BODY MASS INDEX DOCD: CPT

## 2023-02-27 PROCEDURE — 2014F MENTAL STATUS ASSESS: CPT

## 2023-02-27 RX ORDER — FAMOTIDINE 20 MG/1
20 TABLET, FILM COATED ORAL 2 TIMES DAILY
Qty: 60 TABLET | Refills: 1 | Status: SHIPPED | OUTPATIENT
Start: 2023-02-27 | End: 2023-03-17

## 2023-02-27 NOTE — PROGRESS NOTES
"Subjective   Star PACHECO Rollins is a 32 y.o. female. Who presents with complaints of drainage in throat and annual      History of Present Illness     Constant irritation in throat. X 1 yr. Zyrtec helped at first but not anymore.   No acid reflux.   occasional cough.   No CP, SOA, wheezing, fever, chills.     On Spirnolactone for HD. By derm.   On Diamox for eye condition by eye specialist.     PAP- 1 yr ago- normal. has appointment  in April.   The following portions of the patient's history were reviewed and updated as appropriate: allergies, current medications, past family history, past medical history, past social history and past surgical history.    Review of Systems   Constitutional: Negative for fatigue, fever and unexpected weight loss.   HENT: Positive for postnasal drip, rhinorrhea and sore throat (occassional due to irritation). Negative for congestion, sinus pressure, sneezing, swollen glands and trouble swallowing.    Eyes: Negative for visual disturbance.   Respiratory: Negative.    Cardiovascular: Negative.    Gastrointestinal: Negative for abdominal pain, nausea, vomiting, GERD and indigestion.       Objective    /70   Pulse 78   Temp 97.1 °F (36.2 °C) (Temporal)   Resp 16   Ht 157.5 cm (62\")   Wt 119 kg (263 lb)   LMP  (Approximate)   SpO2 94%   BMI 48.10 kg/m²     Physical Exam  Constitutional:       Appearance: Normal appearance. She is not ill-appearing.   HENT:      Right Ear: Ear canal and external ear normal. No middle ear effusion. There is no impacted cerumen. Tympanic membrane is scarred.      Left Ear: Ear canal and external ear normal.  No middle ear effusion. There is no impacted cerumen. Tympanic membrane is scarred.      Nose: Congestion present. No nasal deformity, septal deviation, nasal tenderness or rhinorrhea.      Right Nostril: No foreign body, epistaxis or occlusion.      Left Nostril: No foreign body, epistaxis or occlusion.      Right Turbinates: Not enlarged, " swollen or pale.      Left Turbinates: Not enlarged, swollen or pale.      Mouth/Throat:      Mouth: Mucous membranes are moist.      Pharynx: Oropharynx is clear. No oropharyngeal exudate or posterior oropharyngeal erythema.   Eyes:      Conjunctiva/sclera: Conjunctivae normal.   Cardiovascular:      Rate and Rhythm: Normal rate and regular rhythm.      Pulses: Normal pulses.      Heart sounds: Normal heart sounds. No murmur heard.  Pulmonary:      Effort: Pulmonary effort is normal. No respiratory distress.      Breath sounds: Normal breath sounds.   Lymphadenopathy:      Cervical: No cervical adenopathy.   Neurological:      General: No focal deficit present.      Mental Status: She is alert and oriented to person, place, and time.   Psychiatric:         Attention and Perception: Attention normal.         Mood and Affect: Mood normal.         Speech: Speech normal.         Behavior: Behavior normal.         Thought Content: Thought content normal.         Cognition and Memory: Cognition normal.         Judgment: Judgment normal.       Assessment & Plan   Diagnoses and all orders for this visit:    1. Healthcare maintenance (Primary)  -     CBC w AUTO Differential  -     Basic metabolic panel  -     Counseled on HM. Pap utd.. Eat a healthy diet and exercise routinely. Avoid smoking/alcohol and drugs. Use seatbelt 100% of time.     2. Rhinorrhea  -     Allergen Profile with Component Reflexes, Respiratory-Area 5  -     famotidine (Pepcid) 20 MG tablet; Take 1 tablet by mouth 2 (Two) Times a Day.  Dispense: 60 tablet; Refill: 1    3. Post-nasal drainage  -     Allergen Profile with Component Reflexes, Respiratory-Area 5  -     famotidine (Pepcid) 20 MG tablet; Take 1 tablet by mouth 2 (Two) Times a Day.  Dispense: 60 tablet; Refill: 1    Discussed symptoms most likely allergic vs silent reflux? Will have her rotate antihistamine as can develop resistance. Trial either Claritin, xyzal or allegra instead. Take daily  or nightly.   Add pepcid. Use discussed.   Will check allergy profile.     If no improvement in the next 2-3 months advise to see allergist.       Advised pt that I will be leaving practice in about 3 weeks. If needs refills/questions/concerns call prior.   Advise to start looking for new PCP. List of [facility] provided. May also call in 2-3 months to see if new provider in this office.            - Pt agrees with plan of care and states no further concerns or questions today    This document is intended for medical expert use only. Reading of this document by patients and/or patient's family without participating medical staff guidance may result in misinterpretation and unintended morbidity.  Any interpretation of such data is the responsibility of the patient and/or family member responsible for the patient in concert with their primary or specialist providers, not to be left for sources of online searches such as [employer], [employer] or similar queries. Relying on these approaches to knowledge may result in misinterpretation, misguided goals of care and even death should patients or family members try recommendations outside of the realm of professional medical care in a supervised way.     Please allow 3-5 business days for recommendations based on new results     Go to the ER for any possible lifethreatening symptoms such as chest pain or shortness of air.      I personally spent 20 minutes with this patient, preparing for the visit, reviewing tests, obtaining and/or reviewing a separately obtained history, performing a medically appropriate examination and/or evaluation , counseling and educating the patient/family/caregiver, ordering medications, tests, or procedures, documenting information in the medical record and independently interpreting results.

## 2023-02-28 LAB
BASOPHILS # BLD AUTO: 0.1 X10E3/UL (ref 0–0.2)
BASOPHILS NFR BLD AUTO: 1 %
BUN SERPL-MCNC: 21 MG/DL (ref 6–20)
BUN/CREAT SERPL: 23 (ref 9–23)
CALCIUM SERPL-MCNC: 9.6 MG/DL (ref 8.7–10.2)
CHLORIDE SERPL-SCNC: 102 MMOL/L (ref 96–106)
CO2 SERPL-SCNC: 22 MMOL/L (ref 20–29)
CREAT SERPL-MCNC: 0.9 MG/DL (ref 0.57–1)
EGFRCR SERPLBLD CKD-EPI 2021: 88 ML/MIN/1.73
EOSINOPHIL # BLD AUTO: 0.4 X10E3/UL (ref 0–0.4)
EOSINOPHIL NFR BLD AUTO: 4 %
ERYTHROCYTE [DISTWIDTH] IN BLOOD BY AUTOMATED COUNT: 12.2 % (ref 11.7–15.4)
GLUCOSE SERPL-MCNC: 89 MG/DL (ref 70–99)
HCT VFR BLD AUTO: 42 % (ref 34–46.6)
HGB BLD-MCNC: 14 G/DL (ref 11.1–15.9)
IMM GRANULOCYTES # BLD AUTO: 0 X10E3/UL (ref 0–0.1)
IMM GRANULOCYTES NFR BLD AUTO: 0 %
LYMPHOCYTES # BLD AUTO: 2.9 X10E3/UL (ref 0.7–3.1)
LYMPHOCYTES NFR BLD AUTO: 26 %
MCH RBC QN AUTO: 31 PG (ref 26.6–33)
MCHC RBC AUTO-ENTMCNC: 33.3 G/DL (ref 31.5–35.7)
MCV RBC AUTO: 93 FL (ref 79–97)
MONOCYTES # BLD AUTO: 1 X10E3/UL (ref 0.1–0.9)
MONOCYTES NFR BLD AUTO: 9 %
NEUTROPHILS # BLD AUTO: 6.6 X10E3/UL (ref 1.4–7)
NEUTROPHILS NFR BLD AUTO: 60 %
PLATELET # BLD AUTO: 345 X10E3/UL (ref 150–450)
POTASSIUM SERPL-SCNC: 4.2 MMOL/L (ref 3.5–5.2)
RBC # BLD AUTO: 4.52 X10E6/UL (ref 3.77–5.28)
SODIUM SERPL-SCNC: 140 MMOL/L (ref 134–144)
WBC # BLD AUTO: 11 X10E3/UL (ref 3.4–10.8)

## 2023-02-28 NOTE — PROGRESS NOTES
Please inform pt of lab results    Blood levels stable.  Kidney function & electrolytes stable      Repeat labs in 1-2 years. Call if questions.

## 2023-03-03 LAB
A ALTERNATA IGE QN: <0.1 KU/L
A FUMIGATUS IGE QN: <0.1 KU/L
BERMUDA GRASS IGE QN: <0.1 KU/L
BOXELDER IGE QN: <0.1 KU/L
C HERBARUM IGE QN: <0.1 KU/L
CALIF WALNUT POLN IGE QN: <0.1 KU/L
CAT DANDER IGE QN: <0.1 KU/L
CMN PIGWEED IGE QN: <0.1 KU/L
COMMON RAGWEED IGE QN: <0.1 KU/L
CONV CLASS DESCRIPTION: ABNORMAL
COTTONWOOD IGE QN: <0.1 KU/L
D FARINAE IGE QN: <0.1 KU/L
D PTERONYSS IGE QN: <0.1 KU/L
DOG DANDER IGE QN: <0.1 KU/L
IGE SERPL-ACNC: 3 IU/ML (ref 6–495)
LONDON PLANE IGE QN: <0.1 KU/L
MOUSE URINE PROT IGE QN: <0.1 KU/L
MT JUNIPER IGE QN: <0.1 KU/L
P NOTATUM IGE QN: <0.1 KU/L
PECAN/HICK TREE IGE QN: <0.1 KU/L
ROACH IGE QN: <0.1 KU/L
SALTWORT IGE QN: <0.1 KU/L
SHEEP SORREL IGE QN: <0.1 KU/L
SILVER BIRCH IGE QN: <0.1 KU/L
TIMOTHY IGE QN: <0.1 KU/L
WHITE ASH IGE QN: <0.1 KU/L
WHITE ELM IGE QN: <0.1 KU/L
WHITE MULBERRY IGE QN: <0.1 KU/L
WHITE OAK IGE QN: <0.1 KU/L

## 2023-03-07 ENCOUNTER — TELEPHONE (OUTPATIENT)
Dept: FAMILY MEDICINE CLINIC | Facility: CLINIC | Age: 33
End: 2023-03-07

## 2023-03-07 DIAGNOSIS — R09.82 POST-NASAL DRAINAGE: ICD-10-CM

## 2023-03-07 DIAGNOSIS — J34.89 RHINORRHEA: Primary | ICD-10-CM

## 2023-03-16 ENCOUNTER — TELEPHONE (OUTPATIENT)
Dept: FAMILY MEDICINE CLINIC | Facility: CLINIC | Age: 33
End: 2023-03-16
Payer: COMMERCIAL

## 2023-03-16 NOTE — TELEPHONE ENCOUNTER
Caller: Star Rollins    Relationship to patient: Self    Best call back number: [phone]    Patient is needing: PATIENT STATES THAT SHE JUST LEFT [facility] AND SHE HAD GALLSTONES AND THEY SAID SHE NEEDED TO SEE A SURGEON AS SOON AS SHE COULD SEE INTO ONE TO HAVE HER GALLBLADDER REMOVED. [facility]. PATIENT IS REQUESTING TO KNOW IF THE OFFICE HAD ANY RECOMMENDATIONS FOR A SURGEON. REQUESTING A CALLBACK WITH UPDATES.

## 2023-03-17 ENCOUNTER — OFFICE VISIT (OUTPATIENT)
Dept: FAMILY MEDICINE CLINIC | Facility: CLINIC | Age: 33
End: 2023-03-17
Payer: COMMERCIAL

## 2023-03-17 VITALS
HEIGHT: 62 IN | HEART RATE: 88 BPM | DIASTOLIC BLOOD PRESSURE: 82 MMHG | OXYGEN SATURATION: 100 % | BODY MASS INDEX: 48.21 KG/M2 | SYSTOLIC BLOOD PRESSURE: 132 MMHG | WEIGHT: 262 LBS

## 2023-03-17 DIAGNOSIS — N13.30 HYDRONEPHROSIS, UNSPECIFIED HYDRONEPHROSIS TYPE: ICD-10-CM

## 2023-03-17 DIAGNOSIS — K80.70 CHOLELITHIASIS WITH CHOLEDOCHOLITHIASIS: ICD-10-CM

## 2023-03-17 DIAGNOSIS — R10.13 EPIGASTRIC ABDOMINAL PAIN: ICD-10-CM

## 2023-03-17 PROCEDURE — 99214 OFFICE O/P EST MOD 30 MIN: CPT | Performed by: FAMILY MEDICINE

## 2023-03-17 PROCEDURE — 1160F RVW MEDS BY RX/DR IN RCRD: CPT | Performed by: FAMILY MEDICINE

## 2023-03-17 PROCEDURE — 1159F MED LIST DOCD IN RCRD: CPT | Performed by: FAMILY MEDICINE

## 2023-03-17 RX ORDER — PANTOPRAZOLE SODIUM 40 MG/1
40 TABLET, DELAYED RELEASE ORAL DAILY
Qty: 90 TABLET | Refills: 3 | Status: SHIPPED | OUTPATIENT
Start: 2023-03-17

## 2023-03-17 RX ORDER — DICYCLOMINE HYDROCHLORIDE 10 MG/1
10 CAPSULE ORAL 4 TIMES DAILY PRN
Qty: 120 CAPSULE | Refills: 0 | Status: SHIPPED | OUTPATIENT
Start: 2023-03-17 | End: 2023-04-10

## 2023-03-17 NOTE — TELEPHONE ENCOUNTER
HUB RELAYED MESSAGE. PATIENT VOICED UNDERSTANDING. HUB WARM TRANSFERRED PATIENT TO OFFICE FOR SCHEDULING DUE TO NO AVAILABILITY.

## 2023-03-17 NOTE — TELEPHONE ENCOUNTER
I left detailed message for pt and asked her to call back and let us know if she can come now to be seen. Okay for hub to read.

## 2023-03-17 NOTE — PROGRESS NOTES
Subjective   Star Rollins is a 32 y.o. female. Presents today for   Chief Complaint   Patient presents with   • Anxiety   • Follow-up     Hospital follow up - [facility]       History of Present Illness  The patient is a 31 y/o female that I had come in for evaluation after received call requesting referral to  for possible choley, but RUQ us noted dilated CBD and also mild hydronephrosis.  She reports that prior to ED was at work and shortly after she ate lunch she developed severe abdominal pain which she stated was right-sided and mid epigastric and radiated into her back. She felt the pain was  severe and caused her to be nauseated and lightheaded. She states that it lasted approximately 45 minutes and since seen in ED has calmed down other than mild epigastric pain if pushes on abd.  Has off/on pain like this in past;  Reports several family members have had GB issues and described similar problesm;  Rare heartburn and feels pain more epigastrum than side.  Denies hx of renal stones, no hematuria and denies urinary symptoms.   Has some dyspepsia/nausea off/on.   Denies associating with certain foods and denies increase in flatulance.      Review of Systems   Constitutional: Negative for chills and fever.   Gastrointestinal: Positive for abdominal pain and nausea. Negative for blood in stool, constipation and diarrhea.   Genitourinary: Negative for dysuria, frequency, hematuria and urgency.       Patient Active Problem List   Diagnosis   • Anxiety and depression   • Hidradenitis suppurativa   • Obesity   • Pseudotumor cerebri   • General medical exam       Social History     Socioeconomic History   • Marital status: Single   Tobacco Use   • Smoking status: Never   • Smokeless tobacco: Never   Vaping Use   • Vaping Use: Never used   Substance and Sexual Activity   • Alcohol use: Not Currently     Alcohol/week: 0.0 standard drinks     Comment: socially   • Drug use: No   • Sexual activity: Not Currently      "Partners: Male     Birth control/protection: I.U.D.       No Known Allergies    Current Outpatient Medications on File Prior to Visit   Medication Sig Dispense Refill   • acetaZOLAMIDE (DIAMOX) 125 MG tablet Take 1 tablet by mouth 2 (Two) Times a Day.     • clindamycin (CLINDAGEL) 1 % gel apply to the skin after washing everyday with benzoyl peroxide cleanser     • levonorgestrel (MIRENA) 20 MCG/24HR IUD MIRENA     • spironolactone (ALDACTONE) 100 MG tablet Take 1 tablet by mouth Daily.     • [DISCONTINUED] famotidine (Pepcid) 20 MG tablet Take 1 tablet by mouth 2 (Two) Times a Day. 60 tablet 1     No current facility-administered medications on file prior to visit.       Objective   Vitals:    03/17/23 1522   BP: 132/82   Pulse: 88   SpO2: 100%   Weight: 119 kg (262 lb)   Height: 157.5 cm (62.01\")     Body mass index is 47.91 kg/m².    Physical Exam  Vitals and nursing note reviewed.   Constitutional:       Appearance: She is well-developed.   HENT:      Head: Normocephalic and atraumatic.   Neck:      Thyroid: No thyromegaly.      Vascular: No JVD.   Cardiovascular:      Rate and Rhythm: Normal rate and regular rhythm.      Heart sounds: Normal heart sounds. No murmur heard.    No friction rub. No gallop.   Pulmonary:      Effort: Pulmonary effort is normal. No respiratory distress.      Breath sounds: Normal breath sounds. No wheezing or rales.   Abdominal:      General: Bowel sounds are normal. There is no distension.      Palpations: Abdomen is soft. There is no hepatomegaly.      Tenderness: There is abdominal tenderness in the epigastric area. There is no right CVA tenderness, left CVA tenderness, guarding or rebound. Negative signs include Lin's sign, Rovsing's sign and McBurney's sign.   Musculoskeletal:      Cervical back: Neck supple.   Skin:     General: Skin is warm and dry.   Neurological:      Mental Status: She is alert.   Psychiatric:         Behavior: Behavior normal.       Narrative    EXAM: US " Gallbladder     NUMBER OF IMAGES:  56     Clinical history: 32 years old Female with right upper quadrant pain.     Technique: Multiplanar grayscale ultrasonographic images were obtained of the abdomen on 3/16/2023 3:27 PM. Color doppler images were obtained in regions of interest. Selected static images were presented to radiologist for review.     Comparison: No prior studies available for comparison.     Findings: The liver is grossly normal in size and echotexture. No focal intrahepatic lesions are demonstrated. There is no visualized intrahepatic biliary ductal dilatation. The common bile duct measures 8.4 mm in diameter at the norman hepatis. Views provided of the gallbladder demonstrate no mural or intraluminal abnormalities. There is a 1.7 cm echogenic focus within the gallbladder that demonstrates posterior acoustic shadowing. There is echogenic debris within the gallbladder, consistent with biliary sludge. Gallbladder wall is of normal thickness, measuring 1.8 mm. No pericholecystic fluid is demonstrated.     Views provided of the right kidney demonstrate mild dilatation of the right renal pelvis and calyces. The right kidney measures approximately 10.3 cm x 4.5 cm x 5.0 cm in size. The pancreas is not visualized.     The proximal inferior vena cava appears unremarkable. The abdominal aorta is not visualized.     Impression:   1. Cholelithiasis.   2. Dilated common bile duct of uncertain etiology and clinical significance. Recommend correlate with the patient's biochemical profile and, if clinically warranted, MRCP can be performed for further evaluation.   3. Mild right-sided hydronephrosis.     Dictated by: Esau Timmons MD Signed by Esau Timmons MD on 3/16/2023 3:32 PM   Urinalysis with reflex microscopic  Specimen:  Urine  Component   Ref Range & Units    URINE TYPE  U CleanCatch    URINE  COLOR  Yellow    URINE APPEARANCE   Clear CLEAR    URINE SPECIFIC GRAVITY   1.003 - 1.035 1.020    URINE PH DIPSTICK   7.5    URINE LEUKOCYTE ESTERASE   Negative NEGATIVE    URINE NITRITE   Negative Neg    URINE PROTEIN DIPSTICK   Negative NEGATIVE    URINE GLUCOSE DIPSTICK   Negative NEGATIVE    URINE KETONES DIPSTICK   Negative NEGATIVE    URINE UROBILINOGEN DIPSTICK   EU/dL 0.2    URINE BILIRUBIN DIPSTICK   Negative Neg    URINE BLOOD DIPSTICK   Negative NEGATIVE    Resulting Agency [facility]   Specimen Collected: 03/16/23 14:30 Last Resulted: 03/16/23 14:37   [facility]      Component   Ref Range & Units    WBC   4.0 - 10.5 x10(3)/ul 11.2 High     RBC   3.90 - 5.30 x10(6)/ul 4.45    HGB   12.0 - 16.0 Gram/dL 13.7    Hematocrit   35.0 - 45.0 % 40.3    MCV   83.0 - 96.0 fL 90.7    MCH   28.0 - 34.0 pg 30.9    MCHC   30.0 - 36.0 Gram/dL 34.1    RDW   11.0 - 15.5 % 12.7    Platelets   140 - 490 x10(3)/ul 319    MPV   6.5 - 11.0 fL 7.7    SLIDE REVIEW  No    NRBC   1 - 5 0 Low     Resulting Agency [facility]   Specimen Collected: 03/16/23 14:30 Last Resulted: 03/16/23 14:35   Received From: [facility]          Preg Test, Ur   Negative Neg    Resulting Agency [facility]   Specimen Collected: 03/16/23 14:30 Last Resulted: 03/16/23 14:37   Received From: [facility]  Result Received: 03/17/23 08:54     Lipase  Specimen:  Blood      Lipase   22 - 51 Units/Liter 27    Resulting Agency [facility]   Specimen Collected: 03/16/23 14:30 Last Resulted: 03/16/23 14:       Assessment & Plan   Diagnoses and all orders for this visit:    1. Epigastric abdominal pain  -     Cancel: MRI abdomen w contrast mrcp; Future  -     pantoprazole (PROTONIX) 40 MG EC tablet; Take 1 tablet by mouth Daily.  Dispense: 90 tablet; Refill: 3  -     Ambulatory Referral to General Surgery  -     MRI abdomen w wo contrast mrcp; Future    2. Cholelithiasis with choledocholithiasis  -     Cancel: MRI abdomen w contrast mrcp; Future  -     Ambulatory Referral to General Surgery  -     dicyclomine (BENTYL) 10 MG capsule; Take 1  capsule by mouth 4 (Four) Times a Day As Needed (stomach pain).  Dispense: 120 capsule; Refill: 0  -     MRI abdomen w wo contrast mrcp; Future    3. Hydronephrosis, unspecified hydronephrosis type  -     MRI abdomen w wo contrast mrcp; Future    patient has minimal tenderness on exam today, possible passed gallstone, lfts at ED was normal;  She also had mild hydronephrosis but no blood in urine or urinary symptoms to suggest renal stone.  I discussed at length with CBD being dilated would suggest MRCP vs sending GI for ERCP.   Given lack of pain on exam today and feeling much better will check MRCP and refer to GS for eval/mgmt as well.  D/w red flag symptoms at length and go ED if develop;  WIll have take bentyl as antipspasmodic fo rnow.  Has more gastritis type symptoms (epigastrum worse) and will start PPI in interim.            -Follow up: arrange after imaging and referral

## 2023-03-21 ENCOUNTER — TELEPHONE (OUTPATIENT)
Dept: FAMILY MEDICINE CLINIC | Facility: CLINIC | Age: 33
End: 2023-03-21
Payer: COMMERCIAL

## 2023-03-21 NOTE — TELEPHONE ENCOUNTER
Patient called and said that [facility] wanted the MRI order to show with and w/o contrast before they would schedule.    Pt asking if order can be updated so that she can schedule.    Please advise.

## 2023-03-27 ENCOUNTER — TELEPHONE (OUTPATIENT)
Dept: FAMILY MEDICINE CLINIC | Facility: CLINIC | Age: 33
End: 2023-03-27
Payer: COMMERCIAL

## 2023-03-27 NOTE — TELEPHONE ENCOUNTER
Patient is calling to reschedule her 3/31/23 appt with Dr Streeter because she is not having her MRI until 4/5/23.    She is having the MRI at [facility] in [facility] [address] and wants to wait to see Dr Streeter after the MRI is completed.    PH# [phone]  OK to leave message if doesn't answer.

## 2023-04-05 ENCOUNTER — HOSPITAL ENCOUNTER (OUTPATIENT)
Dept: MRI IMAGING | Facility: HOSPITAL | Age: 33
Discharge: HOME | End: 2023-04-05
Admitting: FAMILY MEDICINE
Payer: COMMERCIAL

## 2023-04-05 DIAGNOSIS — N13.30 HYDRONEPHROSIS, UNSPECIFIED HYDRONEPHROSIS TYPE: ICD-10-CM

## 2023-04-05 DIAGNOSIS — K80.70 CHOLELITHIASIS WITH CHOLEDOCHOLITHIASIS: ICD-10-CM

## 2023-04-05 DIAGNOSIS — R10.13 EPIGASTRIC ABDOMINAL PAIN: ICD-10-CM

## 2023-04-05 PROCEDURE — 0 GADOBENATE DIMEGLUMINE 529 MG/ML SOLUTION: Performed by: FAMILY MEDICINE

## 2023-04-05 PROCEDURE — A9577 INJ MULTIHANCE: HCPCS | Performed by: FAMILY MEDICINE

## 2023-04-05 PROCEDURE — 74183 MRI ABD W/O CNTR FLWD CNTR: CPT

## 2023-04-05 RX ADMIN — GADOBENATE DIMEGLUMINE 20 ML: 529 INJECTION, SOLUTION INTRAVENOUS at 11:13

## 2023-04-08 NOTE — PROGRESS NOTES
Call results to patient.  MRCP of abdomin back and no stones in bile duct, has large stone in gallbladder but no evidence of inflammation.   Still also dilated right kidney, uncertain why.  Recommend see urology - place referral dx hydronephrosis.   Also would still see surgeon as scheduled on 4/10/23.

## 2023-04-10 ENCOUNTER — OFFICE VISIT (OUTPATIENT)
Dept: SURGERY | Facility: CLINIC | Age: 33
End: 2023-04-10
Payer: COMMERCIAL

## 2023-04-10 VITALS — BODY MASS INDEX: 48.76 KG/M2 | HEIGHT: 62 IN | WEIGHT: 265 LBS

## 2023-04-10 DIAGNOSIS — K80.20 CALCULUS OF GALLBLADDER WITHOUT CHOLECYSTITIS WITHOUT OBSTRUCTION: Primary | ICD-10-CM

## 2023-04-10 PROCEDURE — 99203 OFFICE O/P NEW LOW 30 MIN: CPT | Performed by: SURGERY

## 2023-04-10 PROCEDURE — 1159F MED LIST DOCD IN RCRD: CPT | Performed by: SURGERY

## 2023-04-10 PROCEDURE — 1160F RVW MEDS BY RX/DR IN RCRD: CPT | Performed by: SURGERY

## 2023-04-10 RX ORDER — INDOCYANINE GREEN AND WATER 25 MG
2.5 KIT INJECTION ONCE
OUTPATIENT
Start: 2023-06-09 | End: 2023-04-10

## 2023-04-10 RX ORDER — SODIUM CHLORIDE 0.9 % (FLUSH) 0.9 %
10 SYRINGE (ML) INJECTION AS NEEDED
OUTPATIENT
Start: 2023-06-09

## 2023-04-10 RX ORDER — SODIUM CHLORIDE 0.9 % (FLUSH) 0.9 %
10 SYRINGE (ML) INJECTION EVERY 12 HOURS SCHEDULED
OUTPATIENT
Start: 2023-06-09

## 2023-04-10 RX ORDER — SODIUM CHLORIDE 9 MG/ML
40 INJECTION, SOLUTION INTRAVENOUS AS NEEDED
OUTPATIENT
Start: 2023-06-09

## 2023-04-10 NOTE — PROGRESS NOTES
Cc: Abdominal pain, cholelithiasis    History of presenting illness:   This is a quite nice morbidly obese 32-year-old female who around a month ago had an episode of fairly severe epigastric abdominal pain.  This was associated with some nausea and bloating.  She has had trouble with certain foods, particularly spicy and greasy foods for some time but has only had a couple of episodes of any severe pain.  She was worked up for this with an ultrasound which demonstrated stones and a dilated duct.  She had an MRI which indicated a large stone in the gallbladder with no evidence of choledocholithiasis.    Past Medical History: Obesity, pseudotumor cerebri, hidradenitis suppurativa, anxiety and depression    Past Surgical History: Significant only for tympanostomy tube placement and wisdom tooth extraction, denies any history of abdominal surgery    Medications: Diamox, spironolactone, Protonix, Mirena IUD    Allergies: None known    Social History: She is a non-smoker    Family History: Negative for known colon or rectal cancer, there is extensive family history of gallbladder disease    Review of Systems:  Constitutional: Negative for fever, chills, change in weight  Neck: no swollen glands or dysphagia or odynophagia  Respiratory: negative for SOB, cough, hemoptysis or wheezing  Cardiovascular: negative for chest pain, palpitations or peripheral edema  Gastrointestinal: Positive for abdominal pain, bloating, nausea      Physical Exam:  BMI: 48.5, weight 265 pounds  General: alert and oriented, appropriate, no acute distress  Eyes: No scleral icterus, extraocular movements are intact  Neck: Supple without lymphadenopathy or thyromegaly, trachea is in the midline  Respiratory: There is good bilateral chest expansion, no use of accessory muscles is noted  Cardiovascular: No jugular venous distention or peripheral edema is seen  Gastrointestinal: Soft and benign, nontender currently, no mass, no hernia  felt    Laboratory data: Most recent chemistries unremarkable with normal LFTs and total bilirubin of 0.2    Imaging data: Gallbladder ultrasound demonstrates a large gallstone with mildly dilated common bile duct.  MRCP with similar findings.  Of note, CT of the chest from 2020 demonstrated a large gallstone.      Assessment and plan:   -Symptomatic cholelithiasis  -Options discussed with patient.  I have recommended proceeding with robotic cholecystectomy and cholangiogram.  Probably reasonable in her case to attempt extraction in the left upper quadrant although even this area of the abdominal wall is fairly thick at approximately 6 cm.    Risks associated with the procedure are noted to include, but not be limited to, bleeding, infection, injury to intra-abdominal structures including the liver, small and large intestine, stomach, duodenum, common bile duct and major vascular structures.  Possible need for conversion to open surgery, further revisional surgery, postoperative ERCP discussed.      Boy Dsouza MD, FACS  General, Minimally Invasive and Endoscopic Surgery  [facility]    [address], [address]  [place], [place], [address]  P: [phone]  F: [phone]

## 2023-04-11 ENCOUNTER — OFFICE VISIT (OUTPATIENT)
Dept: FAMILY MEDICINE CLINIC | Facility: CLINIC | Age: 33
End: 2023-04-11
Payer: COMMERCIAL

## 2023-04-11 VITALS
WEIGHT: 263 LBS | HEIGHT: 62 IN | DIASTOLIC BLOOD PRESSURE: 64 MMHG | RESPIRATION RATE: 20 BRPM | HEART RATE: 104 BPM | OXYGEN SATURATION: 99 % | BODY MASS INDEX: 48.4 KG/M2 | SYSTOLIC BLOOD PRESSURE: 110 MMHG

## 2023-04-11 DIAGNOSIS — N13.39 OTHER HYDRONEPHROSIS: Primary | ICD-10-CM

## 2023-04-11 DIAGNOSIS — K80.50 BILIARY COLIC: ICD-10-CM

## 2023-04-11 PROCEDURE — 99213 OFFICE O/P EST LOW 20 MIN: CPT | Performed by: FAMILY MEDICINE

## 2023-04-11 NOTE — PROGRESS NOTES
Subjective   Star Rollins is a 32 y.o. female. Presents today for   Chief Complaint   Patient presents with   • Follow-up     Mri follow up -       History of Present Illness  Patient off/on ruq abd pain;  Doing better at moment, however has very large gallstone on imaging;  cbd back normal size, ? Passed stone;    Has lap choley planned;  Did still noted hydronephrosis, no urinary symptoms.      Review of Systems   Constitutional: Negative for chills and fever.   Gastrointestinal: Negative for abdominal pain, nausea and vomiting.   Genitourinary: Negative for difficulty urinating and dysuria.       Patient Active Problem List   Diagnosis   • Anxiety and depression   • Hidradenitis suppurativa   • Obesity   • Pseudotumor cerebri   • General medical exam   • Calculus of gallbladder without cholecystitis without obstruction       Social History     Socioeconomic History   • Marital status: Single   Tobacco Use   • Smoking status: Never   • Smokeless tobacco: Never   Vaping Use   • Vaping Use: Never used   Substance and Sexual Activity   • Alcohol use: Not Currently     Comment: socially   • Drug use: No   • Sexual activity: Not Currently     Partners: Male     Birth control/protection: I.U.D.       No Known Allergies    Current Outpatient Medications on File Prior to Visit   Medication Sig Dispense Refill   • acetaZOLAMIDE (DIAMOX) 125 MG tablet Take 1 tablet by mouth 2 (Two) Times a Day.     • clindamycin (CLINDAGEL) 1 % gel apply to the skin after washing everyday with benzoyl peroxide cleanser     • levonorgestrel (MIRENA) 20 MCG/24HR IUD MIRENA     • pantoprazole (PROTONIX) 40 MG EC tablet Take 1 tablet by mouth Daily. 90 tablet 3   • spironolactone (ALDACTONE) 100 MG tablet Take 1 tablet by mouth Daily.       No current facility-administered medications on file prior to visit.       Objective   Vitals:    04/11/23 1539   BP: 110/64   BP Location: Right leg   Patient Position: Sitting   Pulse: 104   Resp: 20  "  SpO2: 99%   Weight: 119 kg (263 lb)   Height: 157.5 cm (62.01\")   PainSc: 0-No pain     Body mass index is 48.09 kg/m².    Physical Exam  Vitals and nursing note reviewed.   Constitutional:       Appearance: Normal appearance. She is not toxic-appearing or diaphoretic.   HENT:      Head: Normocephalic and atraumatic.   Musculoskeletal:      Cervical back: Neck supple.   Skin:     General: Skin is warm and dry.      Capillary Refill: Capillary refill takes less than 2 seconds.   Neurological:      Mental Status: She is alert.   Psychiatric:         Mood and Affect: Mood normal.         Behavior: Behavior normal.       Study Result    Narrative & Impression   PROCEDURE:  MRI ABDOMEN W WO CONTRAST MRCP     COMPARISON: None     INDICATIONS:  EVALUATE FOR COMMON BILE DUCT STONE     CONTRAST:      20ml  Multihance I.V.     TECHNIQUE:    A comprehensive examination was performed utilizing a variety of imaging planes and   imaging parameters to optimize visualization of suspected pathology.  Images were obtained both   before and after intravenous gadolinium injection. Magnetic resonance cholangiopancreatography was   also performed.     FINDINGS:          Motion limited MRCP.     The lung bases appear normal.  Heart is normal in size.  No liver lesion.  Common bile duct   measures up to 5 millimeters.  There is no definite choledocholithiasis.  There is a large   gallstone in the gallbladder measuring about 3.4 centimeters in long axis.  No definite   cholecystitis is seen.     No splenic or pancreatic lesion is seen.  No pancreatic ductal dilation.  Adrenal glands are   normal.     The right renal pelvis is dilated.  There are 2 tiny left renal cysts.  No enlarged adenopathy is   seen.     Liver measures 17.8 centimeters.  Spleen is normal in size.  No significant hepatic steatosis.  No   other acute finding.     IMPRESSION:                 1. MRCP limited by motion artifact.  2. Cholelithiasis including a large " gallstone measuring 3.4 centimeters.  No definite   cholecystitis.  3. No significant biliary dilation.  No choledocholithiasis.  4. The right renal pelvis is dilated.  This is of uncertain clinical significance.  No previous   imaging at this hospital.  Correlate for any urinary symptoms and with history.  Follow-up as   warranted clinically.          Assessment & Plan   Diagnoses and all orders for this visit:    1. Other hydronephrosis (Primary)  -     Ambulatory Referral to Urology    2. Biliary colic    f/u for lap choley as planned  Refer to urology to evaluate for above         -Follow up:  Prn - RTC if worse or no improvement.

## 2023-04-12 DIAGNOSIS — N13.30 HYDRONEPHROSIS, UNSPECIFIED HYDRONEPHROSIS TYPE: Primary | ICD-10-CM

## 2023-06-05 ENCOUNTER — PRE-ADMISSION TESTING (OUTPATIENT)
Dept: PREADMISSION TESTING | Facility: HOSPITAL | Age: 33
End: 2023-06-05
Payer: COMMERCIAL

## 2023-06-05 VITALS
OXYGEN SATURATION: 99 % | HEIGHT: 62 IN | RESPIRATION RATE: 16 BRPM | WEIGHT: 261.9 LBS | BODY MASS INDEX: 48.2 KG/M2 | DIASTOLIC BLOOD PRESSURE: 93 MMHG | SYSTOLIC BLOOD PRESSURE: 135 MMHG | HEART RATE: 73 BPM | TEMPERATURE: 97.5 F

## 2023-06-05 LAB
ANION GAP SERPL CALCULATED.3IONS-SCNC: 8.5 MMOL/L (ref 5–15)
BUN SERPL-MCNC: 18 MG/DL (ref 6–20)
BUN/CREAT SERPL: 18.6 (ref 7–25)
CALCIUM SPEC-SCNC: 9.8 MG/DL (ref 8.6–10.5)
CHLORIDE SERPL-SCNC: 106 MMOL/L (ref 98–107)
CO2 SERPL-SCNC: 24.5 MMOL/L (ref 22–29)
CREAT SERPL-MCNC: 0.97 MG/DL (ref 0.57–1)
DEPRECATED RDW RBC AUTO: 40.7 FL (ref 37–54)
EGFRCR SERPLBLD CKD-EPI 2021: 80.3 ML/MIN/1.73
ERYTHROCYTE [DISTWIDTH] IN BLOOD BY AUTOMATED COUNT: 11.9 % (ref 12.3–15.4)
GLUCOSE SERPL-MCNC: 104 MG/DL (ref 65–99)
HCG SERPL QL: NEGATIVE
HCT VFR BLD AUTO: 42.6 % (ref 34–46.6)
HGB BLD-MCNC: 14.1 G/DL (ref 12–15.9)
MCH RBC QN AUTO: 30.9 PG (ref 26.6–33)
MCHC RBC AUTO-ENTMCNC: 33.1 G/DL (ref 31.5–35.7)
MCV RBC AUTO: 93.4 FL (ref 79–97)
PLATELET # BLD AUTO: 317 10*3/MM3 (ref 140–450)
PMV BLD AUTO: 9.8 FL (ref 6–12)
POTASSIUM SERPL-SCNC: 4.5 MMOL/L (ref 3.5–5.2)
QT INTERVAL: 418 MS
RBC # BLD AUTO: 4.56 10*6/MM3 (ref 3.77–5.28)
SODIUM SERPL-SCNC: 139 MMOL/L (ref 136–145)
WBC NRBC COR # BLD: 8.11 10*3/MM3 (ref 3.4–10.8)

## 2023-06-05 PROCEDURE — 85027 COMPLETE CBC AUTOMATED: CPT

## 2023-06-05 PROCEDURE — 93005 ELECTROCARDIOGRAM TRACING: CPT

## 2023-06-05 PROCEDURE — 36415 COLL VENOUS BLD VENIPUNCTURE: CPT

## 2023-06-05 PROCEDURE — 80048 BASIC METABOLIC PNL TOTAL CA: CPT

## 2023-06-05 PROCEDURE — 84703 CHORIONIC GONADOTROPIN ASSAY: CPT

## 2023-06-05 RX ORDER — ACETAZOLAMIDE 250 MG/1
250 TABLET ORAL DAILY
COMMUNITY
Start: 2023-05-29

## 2023-06-05 RX ORDER — CHLORHEXIDINE GLUCONATE 500 MG/1
1 CLOTH TOPICAL TAKE AS DIRECTED
COMMUNITY
End: 2023-06-09 | Stop reason: HOSPADM

## 2023-06-05 RX ORDER — CETIRIZINE HYDROCHLORIDE 10 MG/1
10 TABLET ORAL DAILY
COMMUNITY

## 2023-06-05 NOTE — DISCHARGE INSTRUCTIONS
Take the following medications the morning of surgery: PANTOPRAZOLE    TIME OF ARRIVAL: 6 AM    If you are on prescription narcotic pain medication to control your pain you may also take that medication the morning of surgery.    General Instructions:  Do not eat solid food after midnight the night before surgery.  You may drink clear liquids day of surgery but must stop at least one hour before your hospital arrival time.  It is beneficial for you to have a clear drink that contains carbohydrates the day of surgery.  We suggest a 12 to 20 ounce bottle of Gatorade or Powerade for non-diabetic patients or a 12 to 20 ounce bottle of G2 or Powerade Zero for diabetic patients. (Pediatric patients, are not advised to drink a 12 to 20 ounce carbohydrate drink)    Clear liquids are liquids you can see through.  Nothing red in color.     Plain water                               Sports drinks  Sodas                                   Gelatin (Jell-O)  Fruit juices without pulp such as white grape juice and apple juice  Popsicles that contain no fruit or yogurt  Tea or coffee (no cream or milk added)  Gatorade / Powerade  G2 / Powerade Zero    Patients who avoid smoking, chewing tobacco and alcohol for 4 weeks prior to surgery have a reduced risk of post-operative complications.  Quit smoking as many days before surgery as you can.  Do not smoke, use chewing tobacco or drink alcohol the day of surgery.   If applicable bring your C-PAP/ BI-PAP machine in with you to preop day of surgery.  Bring any papers given to you in the doctor’s office.  Wear clean comfortable clothes.  Do not wear contact lenses, false eyelashes or make-up.  Bring a case for your glasses.   Bring crutches or walker if applicable.  Remove all piercings.  Leave jewelry and any other valuables at home.  Hair extensions with metal clips must be removed prior to surgery.  The Pre-Admission Testing nurse will instruct you to bring medications if unable to  obtain an accurate list in Pre-Admission Testing.            Preventing a Surgical Site Infection:  For 2 to 3 days before surgery, avoid shaving with a razor because the razor can irritate skin and make it easier to develop an infection.    Any areas of open skin can increase the risk of a post-operative wound infection by allowing bacteria to enter and travel throughout the body.  Notify your surgeon if you have any skin wounds / rashes even if it is not near the expected surgical site.  The area will need assessed to determine if surgery should be delayed until it is healed.  The night prior to surgery shower using a fresh bar of anti-bacterial soap (such as Dial) and clean washcloth.  Sleep in a clean bed with clean clothing.  Do not allow pets to sleep with you.  Shower on the morning of surgery using a fresh bar of anti-bacterial soap (such as Dial) and clean washcloth.  Dry with a clean towel and dress in clean clothing.  Ask your surgeon if you will be receiving antibiotics prior to surgery.  Make sure you, your family, and all healthcare providers clean their hands with soap and water or an alcohol based hand  before caring for you or your wound.  CHLORHEXIDINE CLOTH INSTRUCTIONS  The morning of surgery follow these instructions using the Chlorhexidine cloths you've been given.  These steps reduce bacteria on the body.  Do not use the cloths near your eyes, ears mouth, genitalia or on open wounds.  Throw the cloths away after use but do not try to flush them down a toilet.      Open and remove one cloth at a time from the package.    Leave the cloth unfolded and begin the bathing.  Massage the skin with the cloths using gentle pressure to remove bacteria.  Do not scrub harshly.   Follow the steps below with one 2% CHG cloth per area (6 total cloths).  One cloth for neck, shoulders and chest.  One cloth for both arms, hands, fingers and underarms (do underarms last).  One cloth for the abdomen  followed by groin.  One cloth for right leg and foot including between the toes.  One cloth for left leg and foot including between the toes.  The last cloth is to be used for the back of the neck, back and buttocks.    Allow the CHG to air dry 3 minutes on the skin which will give it time to work and decrease the chance of irritation.  The skin may feel sticky until it is dry.  Do not rinse with water or any other liquid or you will lose the beneficial effects of the CHG.  If mild skin irritation occurs, do rinse the skin to remove the CHG.  Report this to the nurse at time of admission.  Do not apply lotions, creams, ointments, deodorants or perfumes after using the clothes. Dress in clean clothes before coming to the hospital.    Day of surgery:  Your arrival time is approximately two hours before your scheduled surgery time.  Upon arrival, a Pre-op nurse and Anesthesiologist will review your health history, obtain vital signs, and answer questions you may have.  The only belongings needed at this time will be a list of your home medications and if applicable your C-PAP/BI-PAP machine.  A Pre-op nurse will start an IV and you may receive medication in preparation for surgery, including something to help you relax.     Please be aware that surgery does come with discomfort.  We want to make every effort to control your discomfort so please discuss any uncontrolled symptoms with your nurse.   Your doctor will most likely have prescribed pain medications.      If you are going home after surgery you will receive individualized written care instructions before being discharged.  A responsible adult must drive you to and from the hospital on the day of your surgery and stay with you for 24 hours.  Discharge prescriptions can be filled by the hospital pharmacy during regular pharmacy hours.  If you are having surgery late in the day/evening your prescription may be e-prescribed to your pharmacy.  Please verify your  pharmacy hours or chose a 24 hour pharmacy to avoid not having access to your prescription because your pharmacy has closed for the day.    If you are staying overnight following surgery, you will be transported to your hospital room following the recovery period.  [facility] has all private rooms.    If you have any questions please call Pre-Admission Testing at [phone].  Deductibles and co-payments are collected on the day of service. Please be prepared to pay the required co-pay, deductible or deposit on the day of service as defined by your plan.    Call your surgeon immediately if you experience any of the following symptoms:  Sore Throat  Shortness of Breath or difficulty breathing  Cough  Chills  Body soreness or muscle pain  Headache  Fever  New loss of taste or smell  Do not arrive for your surgery ill.  Your procedure will need to be rescheduled to another time.  You will need to call your physician before the day of surgery to avoid any unnecessary exposure to hospital staff as well as other patients.

## 2023-06-09 ENCOUNTER — ANESTHESIA (OUTPATIENT)
Dept: PERIOP | Facility: HOSPITAL | Age: 33
End: 2023-06-09
Payer: COMMERCIAL

## 2023-06-09 ENCOUNTER — HOSPITAL ENCOUNTER (OUTPATIENT)
Facility: HOSPITAL | Age: 33
Setting detail: HOSPITAL OUTPATIENT SURGERY
Discharge: HOME | End: 2023-06-09
Attending: SURGERY | Admitting: SURGERY
Payer: COMMERCIAL

## 2023-06-09 ENCOUNTER — APPOINTMENT (OUTPATIENT)
Dept: GENERAL RADIOLOGY | Facility: HOSPITAL | Age: 33
End: 2023-06-09
Payer: COMMERCIAL

## 2023-06-09 ENCOUNTER — ANESTHESIA EVENT (OUTPATIENT)
Dept: PERIOP | Facility: HOSPITAL | Age: 33
End: 2023-06-09
Payer: COMMERCIAL

## 2023-06-09 VITALS
OXYGEN SATURATION: 100 % | HEART RATE: 87 BPM | RESPIRATION RATE: 16 BRPM | DIASTOLIC BLOOD PRESSURE: 83 MMHG | SYSTOLIC BLOOD PRESSURE: 123 MMHG | TEMPERATURE: 97.6 F

## 2023-06-09 DIAGNOSIS — K80.20 CALCULUS OF GALLBLADDER WITHOUT CHOLECYSTITIS WITHOUT OBSTRUCTION: ICD-10-CM

## 2023-06-09 PROCEDURE — 88304 TISSUE EXAM BY PATHOLOGIST: CPT | Performed by: SURGERY

## 2023-06-09 PROCEDURE — 25010000002 FENTANYL CITRATE (PF) 50 MCG/ML SOLUTION: Performed by: NURSE ANESTHETIST, CERTIFIED REGISTERED

## 2023-06-09 PROCEDURE — C1758 CATHETER, URETERAL: HCPCS | Performed by: SURGERY

## 2023-06-09 PROCEDURE — 25010000002 PROPOFOL 10 MG/ML EMULSION: Performed by: NURSE ANESTHETIST, CERTIFIED REGISTERED

## 2023-06-09 PROCEDURE — 74300 X-RAY BILE DUCTS/PANCREAS: CPT

## 2023-06-09 PROCEDURE — 25010000002 DEXAMETHASONE SODIUM PHOSPHATE 20 MG/5ML SOLUTION: Performed by: NURSE ANESTHETIST, CERTIFIED REGISTERED

## 2023-06-09 PROCEDURE — 25010000002 CEFAZOLIN PER 500 MG: Performed by: SURGERY

## 2023-06-09 PROCEDURE — 47563 LAPARO CHOLECYSTECTOMY/GRAPH: CPT | Performed by: SURGERY

## 2023-06-09 PROCEDURE — S0260 H&P FOR SURGERY: HCPCS | Performed by: SURGERY

## 2023-06-09 PROCEDURE — 25010000002 MIDAZOLAM PER 1 MG: Performed by: ANESTHESIOLOGY

## 2023-06-09 PROCEDURE — 25010000002 ONDANSETRON PER 1 MG: Performed by: NURSE ANESTHETIST, CERTIFIED REGISTERED

## 2023-06-09 PROCEDURE — 25010000002 SUGAMMADEX 200 MG/2ML SOLUTION: Performed by: NURSE ANESTHETIST, CERTIFIED REGISTERED

## 2023-06-09 PROCEDURE — 25010000002 INDOCYANINE GREEN 25 MG RECONSTITUTED SOLUTION: Performed by: SURGERY

## 2023-06-09 DEVICE — MEDIUM-LARGE LIGATION CLIPS 6 CLIPS/CART
Type: IMPLANTABLE DEVICE | Site: ABDOMEN | Status: FUNCTIONAL
Brand: VAS-Q-CLIP

## 2023-06-09 RX ORDER — HYDROCODONE BITARTRATE AND ACETAMINOPHEN 7.5; 325 MG/1; MG/1
1 TABLET ORAL ONCE AS NEEDED
Status: COMPLETED | OUTPATIENT
Start: 2023-06-09 | End: 2023-06-09

## 2023-06-09 RX ORDER — CEFAZOLIN SODIUM IN 0.9 % NACL 3 G/100 ML
3 INTRAVENOUS SOLUTION, PIGGYBACK (ML) INTRAVENOUS
Status: COMPLETED | OUTPATIENT
Start: 2023-06-09 | End: 2023-06-09

## 2023-06-09 RX ORDER — LABETALOL HYDROCHLORIDE 5 MG/ML
5 INJECTION, SOLUTION INTRAVENOUS
Status: DISCONTINUED | OUTPATIENT
Start: 2023-06-09 | End: 2023-06-09 | Stop reason: HOSPADM

## 2023-06-09 RX ORDER — FENTANYL CITRATE 50 UG/ML
INJECTION, SOLUTION INTRAMUSCULAR; INTRAVENOUS AS NEEDED
Status: DISCONTINUED | OUTPATIENT
Start: 2023-06-09 | End: 2023-06-09 | Stop reason: SURG

## 2023-06-09 RX ORDER — OXYCODONE AND ACETAMINOPHEN 7.5; 325 MG/1; MG/1
1 TABLET ORAL EVERY 4 HOURS PRN
Status: DISCONTINUED | OUTPATIENT
Start: 2023-06-09 | End: 2023-06-09 | Stop reason: HOSPADM

## 2023-06-09 RX ORDER — HYDRALAZINE HYDROCHLORIDE 20 MG/ML
5 INJECTION INTRAMUSCULAR; INTRAVENOUS
Status: DISCONTINUED | OUTPATIENT
Start: 2023-06-09 | End: 2023-06-09 | Stop reason: HOSPADM

## 2023-06-09 RX ORDER — LIDOCAINE HYDROCHLORIDE 10 MG/ML
0.5 INJECTION, SOLUTION EPIDURAL; INFILTRATION; INTRACAUDAL; PERINEURAL ONCE AS NEEDED
Status: DISCONTINUED | OUTPATIENT
Start: 2023-06-09 | End: 2023-06-09 | Stop reason: HOSPADM

## 2023-06-09 RX ORDER — PROPOFOL 10 MG/ML
VIAL (ML) INTRAVENOUS AS NEEDED
Status: DISCONTINUED | OUTPATIENT
Start: 2023-06-09 | End: 2023-06-09 | Stop reason: SURG

## 2023-06-09 RX ORDER — HYDROCODONE BITARTRATE AND ACETAMINOPHEN 5; 325 MG/1; MG/1
1 TABLET ORAL EVERY 6 HOURS PRN
Qty: 20 TABLET | Refills: 0 | Status: SHIPPED | OUTPATIENT
Start: 2023-06-09

## 2023-06-09 RX ORDER — SODIUM CHLORIDE, SODIUM LACTATE, POTASSIUM CHLORIDE, CALCIUM CHLORIDE 600; 310; 30; 20 MG/100ML; MG/100ML; MG/100ML; MG/100ML
9 INJECTION, SOLUTION INTRAVENOUS CONTINUOUS
Status: DISCONTINUED | OUTPATIENT
Start: 2023-06-09 | End: 2023-06-09 | Stop reason: HOSPADM

## 2023-06-09 RX ORDER — INDOCYANINE GREEN AND WATER 25 MG
2.5 KIT INJECTION ONCE
Status: COMPLETED | OUTPATIENT
Start: 2023-06-09 | End: 2023-06-09

## 2023-06-09 RX ORDER — DEXAMETHASONE SODIUM PHOSPHATE 4 MG/ML
INJECTION, SOLUTION INTRA-ARTICULAR; INTRALESIONAL; INTRAMUSCULAR; INTRAVENOUS; SOFT TISSUE AS NEEDED
Status: DISCONTINUED | OUTPATIENT
Start: 2023-06-09 | End: 2023-06-09 | Stop reason: SURG

## 2023-06-09 RX ORDER — DROPERIDOL 2.5 MG/ML
0.62 INJECTION, SOLUTION INTRAMUSCULAR; INTRAVENOUS
Status: DISCONTINUED | OUTPATIENT
Start: 2023-06-09 | End: 2023-06-09 | Stop reason: HOSPADM

## 2023-06-09 RX ORDER — ONDANSETRON 2 MG/ML
4 INJECTION INTRAMUSCULAR; INTRAVENOUS ONCE AS NEEDED
Status: COMPLETED | OUTPATIENT
Start: 2023-06-09 | End: 2023-06-09

## 2023-06-09 RX ORDER — FENTANYL CITRATE 50 UG/ML
50 INJECTION, SOLUTION INTRAMUSCULAR; INTRAVENOUS ONCE AS NEEDED
Status: DISCONTINUED | OUTPATIENT
Start: 2023-06-09 | End: 2023-06-09 | Stop reason: HOSPADM

## 2023-06-09 RX ORDER — SODIUM CHLORIDE 9 MG/ML
INJECTION, SOLUTION INTRAVENOUS AS NEEDED
Status: DISCONTINUED | OUTPATIENT
Start: 2023-06-09 | End: 2023-06-09 | Stop reason: HOSPADM

## 2023-06-09 RX ORDER — SODIUM CHLORIDE 0.9 % (FLUSH) 0.9 %
10 SYRINGE (ML) INJECTION AS NEEDED
Status: DISCONTINUED | OUTPATIENT
Start: 2023-06-09 | End: 2023-06-09 | Stop reason: HOSPADM

## 2023-06-09 RX ORDER — FLUMAZENIL 0.1 MG/ML
0.2 INJECTION INTRAVENOUS AS NEEDED
Status: DISCONTINUED | OUTPATIENT
Start: 2023-06-09 | End: 2023-06-09 | Stop reason: HOSPADM

## 2023-06-09 RX ORDER — PROMETHAZINE HYDROCHLORIDE 25 MG/1
25 TABLET ORAL ONCE AS NEEDED
Status: DISCONTINUED | OUTPATIENT
Start: 2023-06-09 | End: 2023-06-09 | Stop reason: HOSPADM

## 2023-06-09 RX ORDER — NALOXONE HCL 0.4 MG/ML
0.2 VIAL (ML) INJECTION AS NEEDED
Status: DISCONTINUED | OUTPATIENT
Start: 2023-06-09 | End: 2023-06-09 | Stop reason: HOSPADM

## 2023-06-09 RX ORDER — BUPIVACAINE HYDROCHLORIDE AND EPINEPHRINE 5; 5 MG/ML; UG/ML
INJECTION, SOLUTION EPIDURAL; INTRACAUDAL; PERINEURAL AS NEEDED
Status: DISCONTINUED | OUTPATIENT
Start: 2023-06-09 | End: 2023-06-09 | Stop reason: HOSPADM

## 2023-06-09 RX ORDER — SODIUM CHLORIDE 9 MG/ML
40 INJECTION, SOLUTION INTRAVENOUS AS NEEDED
Status: DISCONTINUED | OUTPATIENT
Start: 2023-06-09 | End: 2023-06-09 | Stop reason: HOSPADM

## 2023-06-09 RX ORDER — SODIUM CHLORIDE 0.9 % (FLUSH) 0.9 %
3 SYRINGE (ML) INJECTION EVERY 12 HOURS SCHEDULED
Status: DISCONTINUED | OUTPATIENT
Start: 2023-06-09 | End: 2023-06-09 | Stop reason: HOSPADM

## 2023-06-09 RX ORDER — ONDANSETRON 2 MG/ML
INJECTION INTRAMUSCULAR; INTRAVENOUS AS NEEDED
Status: DISCONTINUED | OUTPATIENT
Start: 2023-06-09 | End: 2023-06-09 | Stop reason: SURG

## 2023-06-09 RX ORDER — EPHEDRINE SULFATE 50 MG/ML
5 INJECTION, SOLUTION INTRAVENOUS ONCE AS NEEDED
Status: DISCONTINUED | OUTPATIENT
Start: 2023-06-09 | End: 2023-06-09 | Stop reason: HOSPADM

## 2023-06-09 RX ORDER — HYDROMORPHONE HYDROCHLORIDE 1 MG/ML
0.5 INJECTION, SOLUTION INTRAMUSCULAR; INTRAVENOUS; SUBCUTANEOUS
Status: DISCONTINUED | OUTPATIENT
Start: 2023-06-09 | End: 2023-06-09 | Stop reason: HOSPADM

## 2023-06-09 RX ORDER — FENTANYL CITRATE 50 UG/ML
50 INJECTION, SOLUTION INTRAMUSCULAR; INTRAVENOUS
Status: DISCONTINUED | OUTPATIENT
Start: 2023-06-09 | End: 2023-06-09 | Stop reason: HOSPADM

## 2023-06-09 RX ORDER — METOCLOPRAMIDE HYDROCHLORIDE 5 MG/ML
10 INJECTION INTRAMUSCULAR; INTRAVENOUS ONCE AS NEEDED
Status: DISCONTINUED | OUTPATIENT
Start: 2023-06-09 | End: 2023-06-09 | Stop reason: HOSPADM

## 2023-06-09 RX ORDER — ROCURONIUM BROMIDE 10 MG/ML
INJECTION, SOLUTION INTRAVENOUS AS NEEDED
Status: DISCONTINUED | OUTPATIENT
Start: 2023-06-09 | End: 2023-06-09 | Stop reason: SURG

## 2023-06-09 RX ORDER — FAMOTIDINE 10 MG/ML
20 INJECTION, SOLUTION INTRAVENOUS ONCE
Status: COMPLETED | OUTPATIENT
Start: 2023-06-09 | End: 2023-06-09

## 2023-06-09 RX ORDER — PROMETHAZINE HYDROCHLORIDE 25 MG/1
25 SUPPOSITORY RECTAL ONCE AS NEEDED
Status: DISCONTINUED | OUTPATIENT
Start: 2023-06-09 | End: 2023-06-09 | Stop reason: HOSPADM

## 2023-06-09 RX ORDER — LIDOCAINE HYDROCHLORIDE 20 MG/ML
INJECTION, SOLUTION INFILTRATION; PERINEURAL AS NEEDED
Status: DISCONTINUED | OUTPATIENT
Start: 2023-06-09 | End: 2023-06-09 | Stop reason: SURG

## 2023-06-09 RX ORDER — SODIUM CHLORIDE, SODIUM LACTATE, POTASSIUM CHLORIDE, CALCIUM CHLORIDE 600; 310; 30; 20 MG/100ML; MG/100ML; MG/100ML; MG/100ML
INJECTION, SOLUTION INTRAVENOUS CONTINUOUS PRN
Status: DISCONTINUED | OUTPATIENT
Start: 2023-06-09 | End: 2023-06-09 | Stop reason: SURG

## 2023-06-09 RX ORDER — SODIUM CHLORIDE 0.9 % (FLUSH) 0.9 %
3-10 SYRINGE (ML) INJECTION AS NEEDED
Status: DISCONTINUED | OUTPATIENT
Start: 2023-06-09 | End: 2023-06-09 | Stop reason: HOSPADM

## 2023-06-09 RX ORDER — SODIUM CHLORIDE 0.9 % (FLUSH) 0.9 %
10 SYRINGE (ML) INJECTION EVERY 12 HOURS SCHEDULED
Status: DISCONTINUED | OUTPATIENT
Start: 2023-06-09 | End: 2023-06-09 | Stop reason: HOSPADM

## 2023-06-09 RX ORDER — IPRATROPIUM BROMIDE AND ALBUTEROL SULFATE 2.5; .5 MG/3ML; MG/3ML
3 SOLUTION RESPIRATORY (INHALATION) ONCE AS NEEDED
Status: DISCONTINUED | OUTPATIENT
Start: 2023-06-09 | End: 2023-06-09 | Stop reason: HOSPADM

## 2023-06-09 RX ORDER — DIPHENHYDRAMINE HYDROCHLORIDE 50 MG/ML
12.5 INJECTION INTRAMUSCULAR; INTRAVENOUS
Status: DISCONTINUED | OUTPATIENT
Start: 2023-06-09 | End: 2023-06-09 | Stop reason: HOSPADM

## 2023-06-09 RX ORDER — MIDAZOLAM HYDROCHLORIDE 1 MG/ML
1 INJECTION INTRAMUSCULAR; INTRAVENOUS
Status: COMPLETED | OUTPATIENT
Start: 2023-06-09 | End: 2023-06-09

## 2023-06-09 RX ADMIN — FENTANYL CITRATE 50 MCG: 50 INJECTION, SOLUTION INTRAMUSCULAR; INTRAVENOUS at 10:03

## 2023-06-09 RX ADMIN — DEXAMETHASONE SODIUM PHOSPHATE 8 MG: 4 INJECTION, SOLUTION INTRAMUSCULAR; INTRAVENOUS at 08:03

## 2023-06-09 RX ADMIN — INDOCYANINE GREEN AND WATER 25 MG: KIT at 06:58

## 2023-06-09 RX ADMIN — CEFAZOLIN 3 G: 10 INJECTION, POWDER, FOR SOLUTION INTRAVENOUS at 07:47

## 2023-06-09 RX ADMIN — SODIUM CHLORIDE, POTASSIUM CHLORIDE, SODIUM LACTATE AND CALCIUM CHLORIDE 9 ML/HR: 600; 310; 30; 20 INJECTION, SOLUTION INTRAVENOUS at 06:51

## 2023-06-09 RX ADMIN — ONDANSETRON 4 MG: 2 INJECTION INTRAMUSCULAR; INTRAVENOUS at 09:53

## 2023-06-09 RX ADMIN — ONDANSETRON 4 MG: 2 INJECTION INTRAMUSCULAR; INTRAVENOUS at 09:15

## 2023-06-09 RX ADMIN — SUGAMMADEX 200 MG: 100 INJECTION, SOLUTION INTRAVENOUS at 09:26

## 2023-06-09 RX ADMIN — HYDROCODONE BITARTRATE AND ACETAMINOPHEN 1 TABLET: 7.5; 325 TABLET ORAL at 10:23

## 2023-06-09 RX ADMIN — FENTANYL CITRATE 25 MCG: 50 INJECTION, SOLUTION INTRAMUSCULAR; INTRAVENOUS at 08:22

## 2023-06-09 RX ADMIN — CEFAZOLIN 3 G: 10 INJECTION, POWDER, FOR SOLUTION INTRAVENOUS at 08:15

## 2023-06-09 RX ADMIN — ROCURONIUM BROMIDE 10 MG: 50 INJECTION, SOLUTION INTRAVENOUS at 08:22

## 2023-06-09 RX ADMIN — SODIUM CHLORIDE, POTASSIUM CHLORIDE, SODIUM LACTATE AND CALCIUM CHLORIDE: 600; 310; 30; 20 INJECTION, SOLUTION INTRAVENOUS at 09:14

## 2023-06-09 RX ADMIN — MIDAZOLAM 1 MG: 1 INJECTION INTRAMUSCULAR; INTRAVENOUS at 07:37

## 2023-06-09 RX ADMIN — FENTANYL CITRATE 50 MCG: 50 INJECTION, SOLUTION INTRAMUSCULAR; INTRAVENOUS at 08:36

## 2023-06-09 RX ADMIN — FENTANYL CITRATE 50 MCG: 50 INJECTION, SOLUTION INTRAMUSCULAR; INTRAVENOUS at 09:53

## 2023-06-09 RX ADMIN — FAMOTIDINE 20 MG: 10 INJECTION INTRAVENOUS at 06:51

## 2023-06-09 RX ADMIN — LIDOCAINE HYDROCHLORIDE 100 MG: 20 INJECTION, SOLUTION INFILTRATION; PERINEURAL at 08:03

## 2023-06-09 RX ADMIN — SODIUM CHLORIDE, POTASSIUM CHLORIDE, SODIUM LACTATE AND CALCIUM CHLORIDE: 600; 310; 30; 20 INJECTION, SOLUTION INTRAVENOUS at 07:58

## 2023-06-09 RX ADMIN — PROPOFOL 200 MG: 10 INJECTION, EMULSION INTRAVENOUS at 08:03

## 2023-06-09 RX ADMIN — FENTANYL CITRATE 25 MCG: 50 INJECTION, SOLUTION INTRAMUSCULAR; INTRAVENOUS at 08:03

## 2023-06-09 RX ADMIN — MIDAZOLAM 1 MG: 1 INJECTION INTRAMUSCULAR; INTRAVENOUS at 06:54

## 2023-06-09 RX ADMIN — ROCURONIUM BROMIDE 40 MG: 50 INJECTION, SOLUTION INTRAVENOUS at 08:03

## 2023-06-09 NOTE — ANESTHESIA PROCEDURE NOTES
Airway  Urgency: elective    Date/Time: 6/9/2023 8:05 AM  Airway not difficult    General Information and Staff    Patient location during procedure: OR  CRNA/CAA: Kalyani Zuniga CRNA    Indications and Patient Condition  Indications for airway management: airway protection    Preoxygenated: yes  Mask difficulty assessment: 1 - vent by mask    Final Airway Details  Final airway type: endotracheal airway      Successful airway: ETT  Cuffed: yes   Successful intubation technique: direct laryngoscopy  Facilitating devices/methods: intubating stylet  Endotracheal tube insertion site: oral  Blade: Rivera  Blade size: 3  ETT size (mm): 7.0  Cormack-Lehane Classification: grade I - full view of glottis  Placement verified by: chest auscultation and capnometry   Cuff volume (mL): 8  Measured from: lips  Number of attempts at approach: 1  Assessment: lips, teeth, and gum same as pre-op and atraumatic intubation

## 2023-06-09 NOTE — H&P
Cc: Abdominal pain, cholelithiasis     History of presenting illness:   This is a quite nice morbidly obese 32-year-old female who in March 2023 had an episode of fairly severe epigastric abdominal pain.  This was associated with some nausea and bloating.  She has had trouble with certain foods, particularly spicy and greasy foods for some time but has only had a couple of episodes of any severe pain.  She was worked up for this with an ultrasound which demonstrated stones and a dilated duct.  She had an MRI which indicated a large stone in the gallbladder with no evidence of choledocholithiasis.  She has also recently been dealing with a kidney infection.     Past Medical History: Obesity, pseudotumor cerebri, hidradenitis suppurativa, anxiety and depression     Past Surgical History: Significant only for tympanostomy tube placement and wisdom tooth extraction, denies any history of abdominal surgery     Medications: Diamox, spironolactone, Protonix, Mirena IUD     Allergies: None known     Social History: She is a non-smoker     Family History: Negative for known colon or rectal cancer, there is extensive family history of gallbladder disease     Review of Systems:  Constitutional: Negative for fever, chills, change in weight  Neck: no swollen glands or dysphagia or odynophagia  Respiratory: negative for SOB, cough, hemoptysis or wheezing  Cardiovascular: negative for chest pain, palpitations or peripheral edema  Gastrointestinal: Positive for abdominal pain, bloating, nausea        Physical Exam:  BMI: 47.9, weight 261 pounds  General: alert and oriented, appropriate, no acute distress  Eyes: No scleral icterus, extraocular movements are intact  Neck: Supple without lymphadenopathy or thyromegaly, trachea is in the midline  Respiratory: There is good bilateral chest expansion, no use of accessory muscles is noted  Cardiovascular: No jugular venous distention or peripheral edema is seen  Gastrointestinal: Soft and  benign, nontender currently, no mass, no hernia felt     Laboratory data: Most recent chemistries unremarkable with normal LFTs and total bilirubin of 0.2, CBC done last week unremarkable     Imaging data: Gallbladder ultrasound demonstrates a large gallstone with mildly dilated common bile duct.  MRCP with similar findings.  Of note, CT of the chest from 2020 demonstrated a large gallstone.        Assessment and plan:   -Symptomatic cholelithiasis  -Options discussed with patient.  I have recommended proceeding with robotic cholecystectomy and cholangiogram.  Probably reasonable in her case to attempt extraction in the left upper quadrant although even this area of the abdominal wall is fairly thick at approximately 6 cm.     Risks associated with the procedure are noted to include, but not be limited to, bleeding, infection, injury to intra-abdominal structures including the liver, small and large intestine, stomach, duodenum, common bile duct and major vascular structures.  Possible need for conversion to open surgery, further revisional surgery, postoperative ERCP discussed.        Boy Dsouza MD, FACS  General, Minimally Invasive and Endoscopic Surgery  [facility]     [address], [address]  [place], [place], [address]  P: [phone]  F: [phone]     Please note, majority of note was copied from previous office visit, edited and updated as appropriate.

## 2023-06-09 NOTE — ANESTHESIA POSTPROCEDURE EVALUATION
Patient: Star Rollins    Procedure Summary       Date: 06/09/23 Room / Location: [facility] OR [address] [place] [address] MAIN OR    Anesthesia Start: 0756 Anesthesia Stop: 0943    Procedure: CHOLECYSTECTOMY LAPAROSCOPIC WITH DAVINCI ROBOT with cholangiogram (Abdomen) Diagnosis:       Calculus of gallbladder without cholecystitis without obstruction      (Calculus of gallbladder without cholecystitis without obstruction [K80.20])    Surgeons: Boy Dsouza MD Provider: Katy Rivero MD    Anesthesia Type: general ASA Status: 3            Anesthesia Type: general    Vitals  Vitals Value Taken Time   /78 06/09/23 1030   Temp 36.4 °C (97.6 °F) 06/09/23 0945   Pulse 84 06/09/23 1046   Resp     SpO2 94 % 06/09/23 1046   Vitals shown include unvalidated device data.        Post Anesthesia Care and Evaluation    Patient location during evaluation: bedside  Patient participation: complete - patient participated  Level of consciousness: awake and alert  Pain management: adequate    Airway patency: patent  Anesthetic complications: No anesthetic complications    Cardiovascular status: acceptable  Respiratory status: acceptable  Hydration status: acceptable    Comments: /83   Pulse 87   Temp 36.4 °C (97.6 °F) (Oral)   Resp 16   SpO2 100%

## 2023-06-09 NOTE — OP NOTE
Operative Note :   MD Star Tavarez PACHECO YoungRollins  1990    Procedure Date: 06/09/23    Pre-op Diagnosis:  Calculus of gallbladder without cholecystitis without obstruction [K80.20]    Post-Op Diagnosis:  Same    Procedure:   Laparoscopic cholecystectomy with cholangiogram and da Amrit robot assistance    Surgeon: Boy Dsouza MD    Assistant: Galina Green APRN; Jann Lakhani CSA was responsible for performing the following activities: Irrigation, suction, retraction, manipulation of the camera, closure of skin and placement of dressings as well as exchange of instruments at bedside and their skilled assistance was necessary for the success of this case.    Anesthesia:  General    EBL:   minimal    Specimens:   Gallbladder and contents    Indications:  33-year-old female with symptomatic cholelithiasis    Findings:   Large stone within the gallbladder  Unremarkable operative cholangiogram    Recommendations:   Routine post cholecystectomy care    Description of procedure:    After obtaining informed consent, patient was brought to the operating room and placed under a general anesthetic.  The abdomen was sterilely prepped and draped.  Gamboa's point was identified and anesthetized with a Marcaine solution.  8 mm skin incision made and then 0 degree scope and Optiview trocar was used with direct access technique, placed through the abdominal wall layers into the abdominal cavity.  The abdomen was then insufflated and inspection of the abdomen demonstrated no evidence of intra-abdominal injury.  Additional 8 mm robotic trocars were then introduced into the abdomen with an 8 mm right lateral trocar, 8 mm right mid abdomen trocar and 8 mm left midabdomen trocar.  The patient was positioned with the head up and right side up.  The da Amrit Xi robot was then brought up and docked.  From right to left the fenestrated bipolar grasper, 30 degree scope, monopolar hook and ProGrasp forceps were  introduced under direct visualization.  I then moved to the console.  The fundus of the gallbladder was grasped and retracted cephalad.  Any adhesions to the fundus or infundibulum were taken down with a combination of sharp and blunt dissection, exposing the infundibulum.  The infundibulum was then grasped and retracted laterally.  The peritoneum overlying the cystic triangle was incised.  Next using a combination of sharp and blunt dissection and using fluorescent cholangiography, the cystic duct was dissected and identified completely.  The cystic artery was identified and dissected completely.  The remainder of the cystic triangle was cleared out, creating the retrocystic window and then the lower one third of the gallbladder was mobilized off the bed of the liver, giving the critical view of safety.  Again the fluoroscopic cholangiography was used to confirm impression of the anatomy.  2 clips were placed proximally on the cystic artery.  A clip was placed on the gallbladder side of the gallbladder cystic duct junction.  An incision was made in the cystic duct.  The cholangiocatheter was introduced through the abdominal wall and clipped into place and the cystic duct.  We shot a cholangiogram and there was good filling of the duodenum.  The distal bile duct had a smooth contour.  The proximal biliary radicles filled nicely.  The cystic duct was then clipped and then divided between the clips.  The cystic artery was divided between clips.  The gallbladder was removed from the bed of the liver with electrocautery and then placed in an Endo Catch bag.  The bed of the liver was inspected and any small bleeders were cauterized.  The clips on the cystic duct and cystic artery were inspected and appeared to be in good position.  The abdomen was irrigated.  Trocars were withdrawn under direct visualization after removal of the gallbladder.  The site to the left of the umbilicus is where the gallbladder was extracted.   The fascia was closed at disposition with 0 Vicryl using the suture passer.  Skin incisions were closed with 4-0 Monocryl.  Patient tolerated this well.    Boy Dsouza MD  General and Endoscopic Surgery  [facility], [place]    [address] [place] [address] [address]  [place], [place], [address]  P: [phone]

## 2023-06-09 NOTE — ANESTHESIA PREPROCEDURE EVALUATION
Anesthesia Evaluation     no history of anesthetic complications:                Airway   Mallampati: II  TM distance: >3 FB  Neck ROM: full  Comment: Excess tissue  Dental - normal exam     Pulmonary    (-) shortness of breath, recent URI, not a smoker    ROS comment: Possible sleep apnea, results pending  Cardiovascular     (-) hypertension, valvular problems/murmurs, hyperlipidemia    ROS comment: NSR    Neuro/Psych  (+) headaches  (-) seizures, dizziness/light headedness, syncope    ROS Comment: Pseudotumor cerebri  GI/Hepatic/Renal/Endo    (+) morbid obesity, GERD  (-) liver disease, no renal disease, diabetes    Musculoskeletal     Abdominal    Substance History      OB/GYN          Other                        Anesthesia Plan    ASA 3     general     intravenous induction     Anesthetic plan, risks, benefits, and alternatives have been provided, discussed and informed consent has been obtained with: patient.    CODE STATUS:

## 2023-06-10 LAB
LAB AP CASE REPORT: NORMAL
PATH REPORT.FINAL DX SPEC: NORMAL
PATH REPORT.GROSS SPEC: NORMAL

## 2024-03-04 DIAGNOSIS — R10.13 EPIGASTRIC ABDOMINAL PAIN: ICD-10-CM

## 2024-03-04 RX ORDER — PANTOPRAZOLE SODIUM 40 MG/1
40 TABLET, DELAYED RELEASE ORAL DAILY
Qty: 90 TABLET | Refills: 0 | Status: SHIPPED | OUTPATIENT
Start: 2024-03-04

## 2024-06-04 DIAGNOSIS — R10.13 EPIGASTRIC ABDOMINAL PAIN: ICD-10-CM

## 2024-06-04 RX ORDER — PANTOPRAZOLE SODIUM 40 MG/1
40 TABLET, DELAYED RELEASE ORAL DAILY
Qty: 90 TABLET | Refills: 0 | OUTPATIENT
Start: 2024-06-04

## 2024-06-11 DIAGNOSIS — R10.13 EPIGASTRIC ABDOMINAL PAIN: ICD-10-CM

## 2024-06-11 RX ORDER — PANTOPRAZOLE SODIUM 40 MG/1
40 TABLET, DELAYED RELEASE ORAL DAILY
Qty: 90 TABLET | Refills: 0 | OUTPATIENT
Start: 2024-06-11

## 2024-06-17 ENCOUNTER — OFFICE VISIT (OUTPATIENT)
Dept: FAMILY MEDICINE CLINIC | Facility: CLINIC | Age: 34
End: 2024-06-17
Payer: COMMERCIAL

## 2024-06-17 VITALS
OXYGEN SATURATION: 98 % | WEIGHT: 271.8 LBS | HEART RATE: 106 BPM | TEMPERATURE: 99.1 F | BODY MASS INDEX: 50.02 KG/M2 | DIASTOLIC BLOOD PRESSURE: 76 MMHG | HEIGHT: 62 IN | SYSTOLIC BLOOD PRESSURE: 130 MMHG

## 2024-06-17 DIAGNOSIS — R10.13 EPIGASTRIC ABDOMINAL PAIN: ICD-10-CM

## 2024-06-17 DIAGNOSIS — Z13.220 LIPID SCREENING: ICD-10-CM

## 2024-06-17 DIAGNOSIS — Z11.59 ENCOUNTER FOR HEPATITIS C SCREENING TEST FOR LOW RISK PATIENT: ICD-10-CM

## 2024-06-17 DIAGNOSIS — D50.8 IRON DEFICIENCY ANEMIA SECONDARY TO INADEQUATE DIETARY IRON INTAKE: ICD-10-CM

## 2024-06-17 DIAGNOSIS — Z13.1 DIABETES MELLITUS SCREENING: Primary | ICD-10-CM

## 2024-06-17 DIAGNOSIS — Z00.00 ENCOUNTER FOR ANNUAL PHYSICAL EXAMINATION EXCLUDING GYNECOLOGICAL EXAMINATION IN A PATIENT OLDER THAN 17 YEARS: ICD-10-CM

## 2024-06-17 PROCEDURE — 1126F AMNT PAIN NOTED NONE PRSNT: CPT | Performed by: NURSE PRACTITIONER

## 2024-06-17 PROCEDURE — 99214 OFFICE O/P EST MOD 30 MIN: CPT | Performed by: NURSE PRACTITIONER

## 2024-06-17 RX ORDER — PANTOPRAZOLE SODIUM 40 MG/1
40 TABLET, DELAYED RELEASE ORAL DAILY
Qty: 90 TABLET | Refills: 0 | Status: SHIPPED | OUTPATIENT
Start: 2024-06-17

## 2024-06-17 RX ORDER — FLUTICASONE PROPIONATE 50 MCG
SPRAY, SUSPENSION (ML) NASAL
COMMUNITY
Start: 2024-06-10

## 2024-06-17 NOTE — PROGRESS NOTES
"Subjective   Star Rollins is a 34 y.o. female. Presents today for Annual physical and medication refills  Chief Complaint   Patient presents with    Med Management       History Of Present Illness  Presents for Annual Physical    Family History of Diabetes on both sides of her family      Patient Active Problem List   Diagnosis    Anxiety and depression    Hidradenitis suppurativa    Obesity    Pseudotumor cerebri    General medical exam    Calculus of gallbladder without cholecystitis without obstruction       Social History     Socioeconomic History    Marital status: Single   Tobacco Use    Smoking status: Never    Smokeless tobacco: Never   Vaping Use    Vaping status: Never Used   Substance and Sexual Activity    Alcohol use: Not Currently     Comment: socially    Drug use: No    Sexual activity: Not Currently     Partners: Male     Birth control/protection: I.U.D.       No Known Allergies    Current Outpatient Medications on File Prior to Visit   Medication Sig Dispense Refill    acetaZOLAMIDE (DIAMOX) 250 MG tablet Take 1 tablet by mouth Daily.      cetirizine (zyrTEC) 10 MG tablet Take 1 tablet by mouth Daily.      clindamycin (CLINDAGEL) 1 % gel apply to the skin after washing everyday with benzoyl peroxide cleanser      fluticasone (FLONASE) 50 MCG/ACT nasal spray Spray twice in each nostril once daily      levonorgestrel (MIRENA) 20 MCG/24HR IUD MIRENA      spironolactone (ALDACTONE) 100 MG tablet Take 1 tablet by mouth Daily.       No current facility-administered medications on file prior to visit.       Objective   Vitals:    06/17/24 1501   BP: 130/76   Pulse: 106   Temp: 99.1 °F (37.3 °C)   SpO2: 98%   Weight: 123 kg (271 lb 12.8 oz)   Height: 157.5 cm (62.01\")     Body mass index is 49.7 kg/m².    Physical Exam  Constitutional:       Appearance: She is obese.   HENT:      Right Ear: Tympanic membrane, ear canal and external ear normal.      Left Ear: Tympanic membrane, ear canal and external ear " normal.      Mouth/Throat:      Mouth: Mucous membranes are dry.   Eyes:      Pupils: Pupils are equal, round, and reactive to light.   Cardiovascular:      Rate and Rhythm: Normal rate and regular rhythm.      Pulses: Normal pulses.      Heart sounds: Normal heart sounds.   Pulmonary:      Effort: Pulmonary effort is normal.      Breath sounds: Normal breath sounds.   Abdominal:      General: Bowel sounds are normal.   Musculoskeletal:         General: Normal range of motion.      Cervical back: Normal range of motion.   Skin:     General: Skin is warm and dry.      Capillary Refill: Capillary refill takes less than 2 seconds.   Neurological:      General: No focal deficit present.      Mental Status: She is alert.      Comments: CN II-XII grossly intact on exam AEB following finger with eyes, puffing cheeks, sticking out tongue, wiggling tongue, raising eyebrows, and shrugging shoulders.   Patient has equal push pull of upper and lower extremities.  Patient can heel walk, toe walk, heel-toe walk, squat and duck walk without difficulty.  There is no s/s of scoliosis while patient bends over ant his provider palpates spine.     Psychiatric:         Mood and Affect: Mood normal.     Procedures     Assessment & Plan   Diagnoses and all orders for this visit:    1. Diabetes mellitus screening (Primary)  -     Hemoglobin A1c  -     Microalbumin / Creatinine Urine Ratio - Urine, Clean Catch    2. Epigastric abdominal pain  -     pantoprazole (PROTONIX) 40 MG EC tablet; Take 1 tablet by mouth Daily.  Dispense: 90 tablet; Refill: 0    3. Encounter for annual physical examination excluding gynecological examination in a patient older than 17 years  -     CBC & Differential  -     Comprehensive Metabolic Panel    4. Lipid screening  -     Lipid Panel    5. Iron deficiency anemia secondary to inadequate dietary iron intake  -     Iron Profile    6. Encounter for hepatitis C screening test for low risk patient  -     HCV  Antibody Rfx To Qnt PCR    Other orders  -     Interpretation:        Class 3 Severe Obesity (BMI >=40). Obesity-related health conditions include the following: none. Obesity is unchanged. BMI is  elevated . We discussed low calorie, low carb based diet program, portion control, increasing exercise, and joining a fitness center or start home based exercise program.     Discussed Care Gaps, ordered referrals and encouraged vaccination updates.       - Pt agrees with plan of care and denies further questions/concerns today  - This document is intended for medical expert use only. Persons  reading this document without medical staff guidance may result in misinterpretation and unintended morbidity     Go to the ER for any possible life-threatening symptoms such as chest pain or shortness of air.      Please allow 3-5 business days for recommendations based on new results      I personally spent time with this patient, preparing for the visit, reviewing tests, obtaining and/or reviewing a separately obtained history, performing a medically appropriate examination and/or evaluation, counseling and educating the patient/family/caregiver, ordering medications,  documenting information in the medical record and indepentently interpreting results.       Return in about 6 months (around 12/17/2024).

## 2024-06-25 LAB
ALBUMIN SERPL-MCNC: 4.3 G/DL (ref 3.9–4.9)
ALBUMIN/CREAT UR: 10 MG/G CREAT (ref 0–29)
ALP SERPL-CCNC: 50 IU/L (ref 44–121)
ALT SERPL-CCNC: 16 IU/L (ref 0–32)
AST SERPL-CCNC: 16 IU/L (ref 0–40)
BASOPHILS # BLD AUTO: 0.1 X10E3/UL (ref 0–0.2)
BASOPHILS NFR BLD AUTO: 1 %
BILIRUB SERPL-MCNC: 0.4 MG/DL (ref 0–1.2)
BUN SERPL-MCNC: 17 MG/DL (ref 6–20)
BUN/CREAT SERPL: 17 (ref 9–23)
CALCIUM SERPL-MCNC: 9.6 MG/DL (ref 8.7–10.2)
CHLORIDE SERPL-SCNC: 101 MMOL/L (ref 96–106)
CHOLEST SERPL-MCNC: 173 MG/DL (ref 100–199)
CO2 SERPL-SCNC: 23 MMOL/L (ref 20–29)
CREAT SERPL-MCNC: 0.99 MG/DL (ref 0.57–1)
CREAT UR-MCNC: 120.7 MG/DL
EGFRCR SERPLBLD CKD-EPI 2021: 77 ML/MIN/1.73
EOSINOPHIL # BLD AUTO: 0.4 X10E3/UL (ref 0–0.4)
EOSINOPHIL NFR BLD AUTO: 5 %
ERYTHROCYTE [DISTWIDTH] IN BLOOD BY AUTOMATED COUNT: 11.9 % (ref 11.7–15.4)
GLOBULIN SER CALC-MCNC: 3.2 G/DL (ref 1.5–4.5)
GLUCOSE SERPL-MCNC: 86 MG/DL (ref 70–99)
HBA1C MFR BLD: 5.6 % (ref 4.8–5.6)
HCT VFR BLD AUTO: 43.1 % (ref 34–46.6)
HCV AB SERPL QL IA: NORMAL
HCV IGG SERPL QL IA: NON REACTIVE
HDLC SERPL-MCNC: 38 MG/DL
HGB BLD-MCNC: 14.2 G/DL (ref 11.1–15.9)
IMM GRANULOCYTES # BLD AUTO: 0.1 X10E3/UL (ref 0–0.1)
IMM GRANULOCYTES NFR BLD AUTO: 2 %
IRON SATN MFR SERPL: 27 % (ref 15–55)
IRON SERPL-MCNC: 89 UG/DL (ref 27–159)
LDLC SERPL CALC-MCNC: 113 MG/DL (ref 0–99)
LYMPHOCYTES # BLD AUTO: 1.9 X10E3/UL (ref 0.7–3.1)
LYMPHOCYTES NFR BLD AUTO: 29 %
MCH RBC QN AUTO: 30.3 PG (ref 26.6–33)
MCHC RBC AUTO-ENTMCNC: 32.9 G/DL (ref 31.5–35.7)
MCV RBC AUTO: 92 FL (ref 79–97)
MICROALBUMIN UR-MCNC: 12.5 UG/ML
MONOCYTES # BLD AUTO: 0.5 X10E3/UL (ref 0.1–0.9)
MONOCYTES NFR BLD AUTO: 8 %
NEUTROPHILS # BLD AUTO: 3.5 X10E3/UL (ref 1.4–7)
NEUTROPHILS NFR BLD AUTO: 55 %
PLATELET # BLD AUTO: 351 X10E3/UL (ref 150–450)
POTASSIUM SERPL-SCNC: 4 MMOL/L (ref 3.5–5.2)
PROT SERPL-MCNC: 7.5 G/DL (ref 6–8.5)
RBC # BLD AUTO: 4.69 X10E6/UL (ref 3.77–5.28)
SODIUM SERPL-SCNC: 138 MMOL/L (ref 134–144)
TIBC SERPL-MCNC: 329 UG/DL (ref 250–450)
TRIGL SERPL-MCNC: 124 MG/DL (ref 0–149)
UIBC SERPL-MCNC: 240 UG/DL (ref 131–425)
VLDLC SERPL CALC-MCNC: 22 MG/DL (ref 5–40)
WBC # BLD AUTO: 6.5 X10E3/UL (ref 3.4–10.8)

## 2024-09-05 ENCOUNTER — TRANSCRIBE ORDERS (OUTPATIENT)
Dept: ADMINISTRATIVE | Facility: HOSPITAL | Age: 34
End: 2024-09-05
Payer: COMMERCIAL

## 2024-09-05 DIAGNOSIS — N13.30 HYDRONEPHROSIS, UNSPECIFIED HYDRONEPHROSIS TYPE: Primary | ICD-10-CM

## 2024-09-18 ENCOUNTER — HOSPITAL ENCOUNTER (OUTPATIENT)
Dept: NUCLEAR MEDICINE | Facility: HOSPITAL | Age: 34
Discharge: HOME | End: 2024-09-18
Payer: COMMERCIAL

## 2024-09-18 DIAGNOSIS — N13.30 HYDRONEPHROSIS, UNSPECIFIED HYDRONEPHROSIS TYPE: ICD-10-CM

## 2024-09-18 PROCEDURE — A9562 TC99M MERTIATIDE: HCPCS | Performed by: UROLOGY

## 2024-09-18 PROCEDURE — 0 TECHNETIUM MERTIATIDE: Performed by: UROLOGY

## 2024-09-18 PROCEDURE — 78708 K FLOW/FUNCT IMAGE W/DRUG: CPT

## 2024-09-18 PROCEDURE — 25010000002 FUROSEMIDE PER 20 MG: Performed by: UROLOGY

## 2024-09-18 RX ORDER — FUROSEMIDE 10 MG/ML
40 INJECTION INTRAMUSCULAR; INTRAVENOUS
Status: COMPLETED | OUTPATIENT
Start: 2024-09-18 | End: 2024-09-18

## 2024-09-18 RX ADMIN — TECHNESCAN TC 99M MERTIATIDE 1 DOSE: 1 INJECTION, POWDER, LYOPHILIZED, FOR SOLUTION INTRAVENOUS at 12:35

## 2024-09-18 RX ADMIN — FUROSEMIDE 40 MG: 10 INJECTION, SOLUTION INTRAMUSCULAR; INTRAVENOUS at 12:44

## 2024-10-13 DIAGNOSIS — R10.13 EPIGASTRIC ABDOMINAL PAIN: ICD-10-CM

## 2024-10-14 RX ORDER — PANTOPRAZOLE SODIUM 40 MG/1
40 TABLET, DELAYED RELEASE ORAL DAILY
Qty: 90 TABLET | Refills: 0 | Status: SHIPPED | OUTPATIENT
Start: 2024-10-14

## 2024-11-25 ENCOUNTER — PRE-ADMISSION TESTING (OUTPATIENT)
Dept: PREADMISSION TESTING | Facility: HOSPITAL | Age: 34
End: 2024-11-25
Payer: COMMERCIAL

## 2024-11-25 VITALS
DIASTOLIC BLOOD PRESSURE: 83 MMHG | TEMPERATURE: 97.3 F | WEIGHT: 263.1 LBS | BODY MASS INDEX: 48.42 KG/M2 | OXYGEN SATURATION: 99 % | HEART RATE: 85 BPM | SYSTOLIC BLOOD PRESSURE: 150 MMHG | HEIGHT: 62 IN | RESPIRATION RATE: 16 BRPM

## 2024-11-25 LAB
ABO GROUP BLD: NORMAL
ANION GAP SERPL CALCULATED.3IONS-SCNC: 10 MMOL/L (ref 5–15)
BLD GP AB SCN SERPL QL: NEGATIVE
BUN SERPL-MCNC: 15 MG/DL (ref 6–20)
BUN/CREAT SERPL: 15.6 (ref 7–25)
CALCIUM SPEC-SCNC: 9.3 MG/DL (ref 8.6–10.5)
CHLORIDE SERPL-SCNC: 102 MMOL/L (ref 98–107)
CO2 SERPL-SCNC: 24 MMOL/L (ref 22–29)
CREAT SERPL-MCNC: 0.96 MG/DL (ref 0.57–1)
DEPRECATED RDW RBC AUTO: 41.5 FL (ref 37–54)
EGFRCR SERPLBLD CKD-EPI 2021: 80.3 ML/MIN/1.73
ERYTHROCYTE [DISTWIDTH] IN BLOOD BY AUTOMATED COUNT: 11.8 % (ref 12.3–15.4)
GLUCOSE SERPL-MCNC: 106 MG/DL (ref 65–99)
HCT VFR BLD AUTO: 44.1 % (ref 34–46.6)
HGB BLD-MCNC: 14.5 G/DL (ref 12–15.9)
MCH RBC QN AUTO: 30.7 PG (ref 26.6–33)
MCHC RBC AUTO-ENTMCNC: 32.9 G/DL (ref 31.5–35.7)
MCV RBC AUTO: 93.4 FL (ref 79–97)
PLATELET # BLD AUTO: 347 10*3/MM3 (ref 140–450)
PMV BLD AUTO: 9.6 FL (ref 6–12)
POTASSIUM SERPL-SCNC: 4.2 MMOL/L (ref 3.5–5.2)
RBC # BLD AUTO: 4.72 10*6/MM3 (ref 3.77–5.28)
RH BLD: POSITIVE
SODIUM SERPL-SCNC: 136 MMOL/L (ref 136–145)
T&S EXPIRATION DATE: NORMAL
WBC NRBC COR # BLD AUTO: 8.19 10*3/MM3 (ref 3.4–10.8)

## 2024-11-25 PROCEDURE — 86901 BLOOD TYPING SEROLOGIC RH(D): CPT

## 2024-11-25 PROCEDURE — 85027 COMPLETE CBC AUTOMATED: CPT

## 2024-11-25 PROCEDURE — 80048 BASIC METABOLIC PNL TOTAL CA: CPT

## 2024-11-25 PROCEDURE — 86850 RBC ANTIBODY SCREEN: CPT

## 2024-11-25 PROCEDURE — 86900 BLOOD TYPING SEROLOGIC ABO: CPT

## 2024-11-25 PROCEDURE — 36415 COLL VENOUS BLD VENIPUNCTURE: CPT

## 2024-11-25 NOTE — DISCHARGE INSTRUCTIONS
Take the following medications the morning of surgery:  NONE      If you are on prescription narcotic pain medication to control your pain you may also take that medication the morning of surgery.    General Instructions:    Follow your surgeons instructions regarding when to stop solid foods and when to stop liquids.   Verify with your surgeon if you are to complete a bowel prep and when to do so.  Patients who avoid smoking, chewing tobacco and alcohol for 4 weeks prior to surgery have a reduced risk of post-operative complications.  Quit smoking as many days before surgery as you can.  Do not smoke, use chewing tobacco or drink alcohol the day of surgery.   If applicable bring your C-PAP/ BI-PAP machine in with you to preop day of surgery.  Bring any papers given to you in the doctor’s office.  Wear clean comfortable clothes.  Do not wear contact lenses, false eyelashes or make-up.  Bring a case for your glasses.   Bring crutches or walker if applicable.  Remove all piercings.  Leave jewelry and any other valuables at home.  Hair extensions with metal clips must be removed prior to surgery.  The Pre-Admission Testing nurse will instruct you to bring medications if unable to obtain an accurate list in Pre-Admission Testing.        If you were given a blood bank ID arm band remember to bring it with you the day of surgery.    Preventing a Surgical Site Infection:  For 2 to 3 days before surgery, avoid shaving with a razor because the razor can irritate skin and make it easier to develop an infection.    Any areas of open skin can increase the risk of a post-operative wound infection by allowing bacteria to enter and travel throughout the body.  Notify your surgeon if you have any skin wounds / rashes even if it is not near the expected surgical site.  The area will need assessed to determine if surgery should be delayed until it is healed.  The night prior to surgery shower using a fresh bar of anti-bacterial soap  (such as Dial) and clean washcloth.  Sleep in a clean bed with clean clothing.  Do not allow pets to sleep with you.  Shower on the morning of surgery using a fresh bar of anti-bacterial soap (such as Dial) and clean washcloth.  Dry with a clean towel and dress in clean clothing.  Ask your surgeon if you will be receiving antibiotics prior to surgery.  Make sure you, your family, and all healthcare providers clean their hands with soap and water or an alcohol based hand  before caring for you or your wound.    Day of surgery:  Your arrival time is approximately two hours before your scheduled surgery time.  Upon arrival, a Pre-op nurse and Anesthesiologist will review your health history, obtain vital signs, and answer questions you may have.  The only belongings needed at this time will be a list of your home medications and if applicable your C-PAP/BI-PAP machine.  A Pre-op nurse will start an IV and you may receive medication in preparation for surgery, including something to help you relax.     Please be aware that surgery does come with discomfort.  We want to make every effort to control your discomfort so please discuss any uncontrolled symptoms with your nurse.   Your doctor will most likely have prescribed pain medications.      If you are going home after surgery you will receive individualized written care instructions before being discharged.  A responsible adult must drive you to and from the hospital on the day of your surgery and stay with you for 24 hours.  Discharge prescriptions can be filled by the hospital pharmacy during regular pharmacy hours.  If you are having surgery late in the day/evening your prescription may be e-prescribed to your pharmacy.  Please verify your pharmacy hours or chose a 24 hour pharmacy to avoid not having access to your prescription because your pharmacy has closed for the day.    If you are staying overnight following surgery, you will be transported to your  hospital room following the recovery period.  [facility] has all private rooms.    If you have any questions please call Pre-Admission Testing at [phone].  Deductibles and co-payments are collected on the day of service. Please be prepared to pay the required co-pay, deductible or deposit on the day of service as defined by your plan.    Call your surgeon immediately if you experience any of the following symptoms:  Sore Throat  Shortness of Breath or difficulty breathing  Cough  Chills  Body soreness or muscle pain  Headache  Fever  New loss of taste or smell  Do not arrive for your surgery ill.  Your procedure will need to be rescheduled to another time.  You will need to call your physician before the day of surgery to avoid any unnecessary exposure to hospital staff as well as other patients.

## 2024-12-02 ENCOUNTER — ANESTHESIA EVENT (OUTPATIENT)
Dept: PERIOP | Facility: HOSPITAL | Age: 34
End: 2024-12-02
Payer: COMMERCIAL

## 2024-12-05 ENCOUNTER — HOSPITAL ENCOUNTER (OUTPATIENT)
Facility: HOSPITAL | Age: 34
Discharge: HOME | End: 2024-12-06
Attending: UROLOGY | Admitting: UROLOGY
Payer: COMMERCIAL

## 2024-12-05 ENCOUNTER — ANESTHESIA (OUTPATIENT)
Dept: PERIOP | Facility: HOSPITAL | Age: 34
End: 2024-12-05
Payer: COMMERCIAL

## 2024-12-05 DIAGNOSIS — Z98.890 HISTORY OF PYELOPLASTY: ICD-10-CM

## 2024-12-05 DIAGNOSIS — Z87.448 HISTORY OF PYELONEPHRITIS: ICD-10-CM

## 2024-12-05 DIAGNOSIS — N13.5 URETEROPELVIC JUNCTION (UPJ) OBSTRUCTION, RIGHT: Primary | ICD-10-CM

## 2024-12-05 DIAGNOSIS — Z87.448 HISTORY OF PYELOPLASTY: ICD-10-CM

## 2024-12-05 LAB
B-HCG UR QL: NEGATIVE
EXPIRATION DATE: NORMAL
INTERNAL NEGATIVE CONTROL: NEGATIVE
INTERNAL POSITIVE CONTROL: POSITIVE
Lab: NORMAL

## 2024-12-05 PROCEDURE — 25810000003 LACTATED RINGERS PER 1000 ML: Performed by: ANESTHESIOLOGY

## 2024-12-05 PROCEDURE — 25010000002 SUGAMMADEX 200 MG/2ML SOLUTION: Performed by: NURSE ANESTHETIST, CERTIFIED REGISTERED

## 2024-12-05 PROCEDURE — 88305 TISSUE EXAM BY PATHOLOGIST: CPT | Performed by: UROLOGY

## 2024-12-05 PROCEDURE — 25010000002 HYDROMORPHONE PER 4 MG: Performed by: NURSE ANESTHETIST, CERTIFIED REGISTERED

## 2024-12-05 PROCEDURE — 25810000003 SODIUM CHLORIDE 0.9 % SOLUTION 250 ML FLEX CONT: Performed by: NURSE ANESTHETIST, CERTIFIED REGISTERED

## 2024-12-05 PROCEDURE — 25010000002 MIDAZOLAM PER 1 MG: Performed by: ANESTHESIOLOGY

## 2024-12-05 PROCEDURE — C1769 GUIDE WIRE: HCPCS | Performed by: UROLOGY

## 2024-12-05 PROCEDURE — 25010000002 CEFAZOLIN PER 500 MG: Performed by: UROLOGY

## 2024-12-05 PROCEDURE — 25010000002 BUPIVACAINE 0.25 % SOLUTION: Performed by: UROLOGY

## 2024-12-05 PROCEDURE — 25010000002 PHENYLEPHRINE 10 MG/ML SOLUTION: Performed by: NURSE ANESTHETIST, CERTIFIED REGISTERED

## 2024-12-05 PROCEDURE — 25010000002 ONDANSETRON PER 1 MG: Performed by: NURSE ANESTHETIST, CERTIFIED REGISTERED

## 2024-12-05 PROCEDURE — 25010000002 KETOROLAC TROMETHAMINE PER 15 MG: Performed by: NURSE ANESTHETIST, CERTIFIED REGISTERED

## 2024-12-05 PROCEDURE — C2617 STENT, NON-COR, TEM W/O DEL: HCPCS | Performed by: UROLOGY

## 2024-12-05 PROCEDURE — 25010000002 LIDOCAINE 2% SOLUTION: Performed by: NURSE ANESTHETIST, CERTIFIED REGISTERED

## 2024-12-05 PROCEDURE — 25010000002 PROPOFOL 10 MG/ML EMULSION: Performed by: NURSE ANESTHETIST, CERTIFIED REGISTERED

## 2024-12-05 PROCEDURE — G0378 HOSPITAL OBSERVATION PER HR: HCPCS

## 2024-12-05 PROCEDURE — 25010000002 FENTANYL CITRATE (PF) 50 MCG/ML SOLUTION: Performed by: NURSE ANESTHETIST, CERTIFIED REGISTERED

## 2024-12-05 PROCEDURE — 25010000002 PHENYLEPHRINE 10 MG/ML SOLUTION 5 ML VIAL: Performed by: NURSE ANESTHETIST, CERTIFIED REGISTERED

## 2024-12-05 PROCEDURE — 81025 URINE PREGNANCY TEST: CPT | Performed by: ANESTHESIOLOGY

## 2024-12-05 PROCEDURE — 25010000002 DEXAMETHASONE SODIUM PHOSPHATE 20 MG/5ML SOLUTION: Performed by: NURSE ANESTHETIST, CERTIFIED REGISTERED

## 2024-12-05 DEVICE — FLOSEAL WITH RECOTHROM - 10ML.
Type: IMPLANTABLE DEVICE | Site: ABDOMEN | Status: FUNCTIONAL
Brand: FLOSEAL HEMOSTATIC MATRIX

## 2024-12-05 DEVICE — URETERAL STENT
Type: IMPLANTABLE DEVICE | Site: URETER | Status: FUNCTIONAL
Brand: CONTOUR™

## 2024-12-05 RX ORDER — MIDAZOLAM HYDROCHLORIDE 1 MG/ML
1 INJECTION, SOLUTION INTRAMUSCULAR; INTRAVENOUS
Status: COMPLETED | OUTPATIENT
Start: 2024-12-05 | End: 2024-12-05

## 2024-12-05 RX ORDER — SPIRONOLACTONE 50 MG/1
100 TABLET, FILM COATED ORAL NIGHTLY
Status: DISCONTINUED | OUTPATIENT
Start: 2024-12-05 | End: 2024-12-06 | Stop reason: HOSPADM

## 2024-12-05 RX ORDER — CETIRIZINE HYDROCHLORIDE 10 MG/1
10 TABLET ORAL DAILY
Status: DISCONTINUED | OUTPATIENT
Start: 2024-12-05 | End: 2024-12-06 | Stop reason: HOSPADM

## 2024-12-05 RX ORDER — LIDOCAINE HYDROCHLORIDE 20 MG/ML
INJECTION, SOLUTION INFILTRATION; PERINEURAL AS NEEDED
Status: DISCONTINUED | OUTPATIENT
Start: 2024-12-05 | End: 2024-12-05 | Stop reason: SURG

## 2024-12-05 RX ORDER — ATROPINE SULFATE 0.4 MG/ML
0.4 INJECTION, SOLUTION INTRAMUSCULAR; INTRAVENOUS; SUBCUTANEOUS ONCE AS NEEDED
Status: DISCONTINUED | OUTPATIENT
Start: 2024-12-05 | End: 2024-12-05 | Stop reason: HOSPADM

## 2024-12-05 RX ORDER — FENTANYL CITRATE 50 UG/ML
50 INJECTION, SOLUTION INTRAMUSCULAR; INTRAVENOUS
Status: DISCONTINUED | OUTPATIENT
Start: 2024-12-05 | End: 2024-12-05 | Stop reason: HOSPADM

## 2024-12-05 RX ORDER — ACETAMINOPHEN 325 MG/1
650 TABLET ORAL EVERY 4 HOURS PRN
Status: DISCONTINUED | OUTPATIENT
Start: 2024-12-05 | End: 2024-12-06 | Stop reason: HOSPADM

## 2024-12-05 RX ORDER — HYDROCODONE BITARTRATE AND ACETAMINOPHEN 7.5; 325 MG/1; MG/1
1 TABLET ORAL EVERY 4 HOURS PRN
Status: DISCONTINUED | OUTPATIENT
Start: 2024-12-05 | End: 2024-12-06 | Stop reason: HOSPADM

## 2024-12-05 RX ORDER — SODIUM CHLORIDE, SODIUM LACTATE, POTASSIUM CHLORIDE, CALCIUM CHLORIDE 600; 310; 30; 20 MG/100ML; MG/100ML; MG/100ML; MG/100ML
9 INJECTION, SOLUTION INTRAVENOUS CONTINUOUS
Status: DISCONTINUED | OUTPATIENT
Start: 2024-12-05 | End: 2024-12-06 | Stop reason: HOSPADM

## 2024-12-05 RX ORDER — HYDROCODONE BITARTRATE AND ACETAMINOPHEN 5; 325 MG/1; MG/1
1 TABLET ORAL EVERY 6 HOURS PRN
Qty: 20 TABLET | Refills: 0 | Status: SHIPPED | OUTPATIENT
Start: 2024-12-05

## 2024-12-05 RX ORDER — DEXAMETHASONE SODIUM PHOSPHATE 4 MG/ML
INJECTION, SOLUTION INTRA-ARTICULAR; INTRALESIONAL; INTRAMUSCULAR; INTRAVENOUS; SOFT TISSUE AS NEEDED
Status: DISCONTINUED | OUTPATIENT
Start: 2024-12-05 | End: 2024-12-05 | Stop reason: SURG

## 2024-12-05 RX ORDER — KETOROLAC TROMETHAMINE 30 MG/ML
30 INJECTION, SOLUTION INTRAMUSCULAR; INTRAVENOUS EVERY 6 HOURS PRN
Status: DISCONTINUED | OUTPATIENT
Start: 2024-12-05 | End: 2024-12-06 | Stop reason: HOSPADM

## 2024-12-05 RX ORDER — FAMOTIDINE 10 MG/ML
20 INJECTION, SOLUTION INTRAVENOUS ONCE
Status: COMPLETED | OUTPATIENT
Start: 2024-12-05 | End: 2024-12-05

## 2024-12-05 RX ORDER — FENTANYL CITRATE 50 UG/ML
INJECTION, SOLUTION INTRAMUSCULAR; INTRAVENOUS AS NEEDED
Status: DISCONTINUED | OUTPATIENT
Start: 2024-12-05 | End: 2024-12-05 | Stop reason: SURG

## 2024-12-05 RX ORDER — ONDANSETRON 4 MG/1
4 TABLET, ORALLY DISINTEGRATING ORAL EVERY 6 HOURS PRN
Status: DISCONTINUED | OUTPATIENT
Start: 2024-12-05 | End: 2024-12-06 | Stop reason: HOSPADM

## 2024-12-05 RX ORDER — FLUMAZENIL 0.1 MG/ML
0.2 INJECTION INTRAVENOUS AS NEEDED
Status: DISCONTINUED | OUTPATIENT
Start: 2024-12-05 | End: 2024-12-05 | Stop reason: HOSPADM

## 2024-12-05 RX ORDER — ACETAZOLAMIDE 250 MG/1
250 TABLET ORAL DAILY
Status: DISCONTINUED | OUTPATIENT
Start: 2024-12-05 | End: 2024-12-06 | Stop reason: HOSPADM

## 2024-12-05 RX ORDER — OXYCODONE AND ACETAMINOPHEN 7.5; 325 MG/1; MG/1
1 TABLET ORAL EVERY 4 HOURS PRN
Status: DISCONTINUED | OUTPATIENT
Start: 2024-12-05 | End: 2024-12-05 | Stop reason: HOSPADM

## 2024-12-05 RX ORDER — ONDANSETRON 2 MG/ML
INJECTION INTRAMUSCULAR; INTRAVENOUS AS NEEDED
Status: DISCONTINUED | OUTPATIENT
Start: 2024-12-05 | End: 2024-12-05

## 2024-12-05 RX ORDER — DIPHENHYDRAMINE HYDROCHLORIDE 50 MG/ML
12.5 INJECTION INTRAMUSCULAR; INTRAVENOUS
Status: DISCONTINUED | OUTPATIENT
Start: 2024-12-05 | End: 2024-12-05 | Stop reason: HOSPADM

## 2024-12-05 RX ORDER — LABETALOL HYDROCHLORIDE 5 MG/ML
5 INJECTION, SOLUTION INTRAVENOUS
Status: DISCONTINUED | OUTPATIENT
Start: 2024-12-05 | End: 2024-12-05 | Stop reason: HOSPADM

## 2024-12-05 RX ORDER — NALOXONE HCL 0.4 MG/ML
0.1 VIAL (ML) INJECTION
Status: DISCONTINUED | OUTPATIENT
Start: 2024-12-05 | End: 2024-12-06 | Stop reason: HOSPADM

## 2024-12-05 RX ORDER — ROCURONIUM BROMIDE 10 MG/ML
INJECTION, SOLUTION INTRAVENOUS AS NEEDED
Status: DISCONTINUED | OUTPATIENT
Start: 2024-12-05 | End: 2024-12-05 | Stop reason: SURG

## 2024-12-05 RX ORDER — SODIUM CHLORIDE 450 MG/100ML
75 INJECTION, SOLUTION INTRAVENOUS CONTINUOUS
Status: DISCONTINUED | OUTPATIENT
Start: 2024-12-05 | End: 2024-12-06 | Stop reason: HOSPADM

## 2024-12-05 RX ORDER — ONDANSETRON 2 MG/ML
4 INJECTION INTRAMUSCULAR; INTRAVENOUS EVERY 6 HOURS PRN
Status: DISCONTINUED | OUTPATIENT
Start: 2024-12-05 | End: 2024-12-06 | Stop reason: HOSPADM

## 2024-12-05 RX ORDER — IPRATROPIUM BROMIDE AND ALBUTEROL SULFATE 2.5; .5 MG/3ML; MG/3ML
3 SOLUTION RESPIRATORY (INHALATION) ONCE AS NEEDED
Status: DISCONTINUED | OUTPATIENT
Start: 2024-12-05 | End: 2024-12-05 | Stop reason: HOSPADM

## 2024-12-05 RX ORDER — SODIUM CHLORIDE 0.9 % (FLUSH) 0.9 %
3-10 SYRINGE (ML) INJECTION AS NEEDED
Status: DISCONTINUED | OUTPATIENT
Start: 2024-12-05 | End: 2024-12-05 | Stop reason: HOSPADM

## 2024-12-05 RX ORDER — DOCUSATE SODIUM 100 MG/1
100 CAPSULE, LIQUID FILLED ORAL 2 TIMES DAILY PRN
Status: DISCONTINUED | OUTPATIENT
Start: 2024-12-05 | End: 2024-12-06 | Stop reason: HOSPADM

## 2024-12-05 RX ORDER — BUPIVACAINE HYDROCHLORIDE 2.5 MG/ML
INJECTION, SOLUTION INFILTRATION; PERINEURAL AS NEEDED
Status: DISCONTINUED | OUTPATIENT
Start: 2024-12-05 | End: 2024-12-05 | Stop reason: HOSPADM

## 2024-12-05 RX ORDER — PANTOPRAZOLE SODIUM 40 MG/1
40 TABLET, DELAYED RELEASE ORAL DAILY
Status: DISCONTINUED | OUTPATIENT
Start: 2024-12-05 | End: 2024-12-06 | Stop reason: HOSPADM

## 2024-12-05 RX ORDER — NALOXONE HCL 0.4 MG/ML
0.2 VIAL (ML) INJECTION AS NEEDED
Status: DISCONTINUED | OUTPATIENT
Start: 2024-12-05 | End: 2024-12-05 | Stop reason: HOSPADM

## 2024-12-05 RX ORDER — KETOROLAC TROMETHAMINE 30 MG/ML
INJECTION, SOLUTION INTRAMUSCULAR; INTRAVENOUS AS NEEDED
Status: DISCONTINUED | OUTPATIENT
Start: 2024-12-05 | End: 2024-12-05 | Stop reason: SURG

## 2024-12-05 RX ORDER — ACETAMINOPHEN 650 MG/1
650 SUPPOSITORY RECTAL EVERY 4 HOURS PRN
Status: DISCONTINUED | OUTPATIENT
Start: 2024-12-05 | End: 2024-12-06 | Stop reason: HOSPADM

## 2024-12-05 RX ORDER — PROMETHAZINE HYDROCHLORIDE 25 MG/1
25 TABLET ORAL ONCE AS NEEDED
Status: DISCONTINUED | OUTPATIENT
Start: 2024-12-05 | End: 2024-12-05 | Stop reason: HOSPADM

## 2024-12-05 RX ORDER — PHENYLEPHRINE HYDROCHLORIDE 10 MG/ML
INJECTION INTRAVENOUS AS NEEDED
Status: DISCONTINUED | OUTPATIENT
Start: 2024-12-05 | End: 2024-12-05 | Stop reason: SURG

## 2024-12-05 RX ORDER — ONDANSETRON 2 MG/ML
INJECTION INTRAMUSCULAR; INTRAVENOUS AS NEEDED
Status: DISCONTINUED | OUTPATIENT
Start: 2024-12-05 | End: 2024-12-05 | Stop reason: SURG

## 2024-12-05 RX ORDER — EPHEDRINE SULFATE 50 MG/ML
5 INJECTION, SOLUTION INTRAVENOUS ONCE AS NEEDED
Status: DISCONTINUED | OUTPATIENT
Start: 2024-12-05 | End: 2024-12-05 | Stop reason: HOSPADM

## 2024-12-05 RX ORDER — HYDROCODONE BITARTRATE AND ACETAMINOPHEN 5; 325 MG/1; MG/1
1 TABLET ORAL ONCE AS NEEDED
Status: DISCONTINUED | OUTPATIENT
Start: 2024-12-05 | End: 2024-12-05 | Stop reason: HOSPADM

## 2024-12-05 RX ORDER — PROPOFOL 10 MG/ML
VIAL (ML) INTRAVENOUS AS NEEDED
Status: DISCONTINUED | OUTPATIENT
Start: 2024-12-05 | End: 2024-12-05 | Stop reason: SURG

## 2024-12-05 RX ORDER — HYDROMORPHONE HYDROCHLORIDE 1 MG/ML
0.5 INJECTION, SOLUTION INTRAMUSCULAR; INTRAVENOUS; SUBCUTANEOUS
Status: DISCONTINUED | OUTPATIENT
Start: 2024-12-05 | End: 2024-12-05 | Stop reason: HOSPADM

## 2024-12-05 RX ORDER — PROMETHAZINE HYDROCHLORIDE 25 MG/1
25 SUPPOSITORY RECTAL ONCE AS NEEDED
Status: DISCONTINUED | OUTPATIENT
Start: 2024-12-05 | End: 2024-12-05 | Stop reason: HOSPADM

## 2024-12-05 RX ORDER — CEPHALEXIN 500 MG/1
500 CAPSULE ORAL 2 TIMES DAILY
Qty: 10 CAPSULE | Refills: 0 | Status: SHIPPED | OUTPATIENT
Start: 2024-12-05 | End: 2024-12-11 | Stop reason: HOSPADM

## 2024-12-05 RX ORDER — HYDROMORPHONE HYDROCHLORIDE 1 MG/ML
0.5 INJECTION, SOLUTION INTRAMUSCULAR; INTRAVENOUS; SUBCUTANEOUS
Status: DISCONTINUED | OUTPATIENT
Start: 2024-12-05 | End: 2024-12-06 | Stop reason: HOSPADM

## 2024-12-05 RX ORDER — HYDRALAZINE HYDROCHLORIDE 20 MG/ML
5 INJECTION INTRAMUSCULAR; INTRAVENOUS
Status: DISCONTINUED | OUTPATIENT
Start: 2024-12-05 | End: 2024-12-05 | Stop reason: HOSPADM

## 2024-12-05 RX ORDER — LIDOCAINE HYDROCHLORIDE 10 MG/ML
0.5 INJECTION, SOLUTION INFILTRATION; PERINEURAL ONCE AS NEEDED
Status: DISCONTINUED | OUTPATIENT
Start: 2024-12-05 | End: 2024-12-05 | Stop reason: HOSPADM

## 2024-12-05 RX ORDER — FENTANYL CITRATE 50 UG/ML
50 INJECTION, SOLUTION INTRAMUSCULAR; INTRAVENOUS ONCE AS NEEDED
Status: DISCONTINUED | OUTPATIENT
Start: 2024-12-05 | End: 2024-12-05 | Stop reason: HOSPADM

## 2024-12-05 RX ORDER — ONDANSETRON 2 MG/ML
4 INJECTION INTRAMUSCULAR; INTRAVENOUS ONCE AS NEEDED
Status: DISCONTINUED | OUTPATIENT
Start: 2024-12-05 | End: 2024-12-05 | Stop reason: HOSPADM

## 2024-12-05 RX ORDER — DROPERIDOL 2.5 MG/ML
0.62 INJECTION, SOLUTION INTRAMUSCULAR; INTRAVENOUS
Status: DISCONTINUED | OUTPATIENT
Start: 2024-12-05 | End: 2024-12-05 | Stop reason: HOSPADM

## 2024-12-05 RX ORDER — SODIUM CHLORIDE 0.9 % (FLUSH) 0.9 %
3 SYRINGE (ML) INJECTION EVERY 12 HOURS SCHEDULED
Status: DISCONTINUED | OUTPATIENT
Start: 2024-12-05 | End: 2024-12-05 | Stop reason: HOSPADM

## 2024-12-05 RX ADMIN — PROPOFOL 200 MG: 10 INJECTION, EMULSION INTRAVENOUS at 15:15

## 2024-12-05 RX ADMIN — MIDAZOLAM 1 MG: 1 INJECTION INTRAMUSCULAR; INTRAVENOUS at 14:28

## 2024-12-05 RX ADMIN — ROCURONIUM BROMIDE 50 MG: 10 INJECTION, SOLUTION INTRAVENOUS at 15:15

## 2024-12-05 RX ADMIN — PHENYLEPHRINE HYDROCHLORIDE 100 MCG: 50 INJECTION INTRAVENOUS at 15:40

## 2024-12-05 RX ADMIN — LIDOCAINE HYDROCHLORIDE 60 MG: 20 INJECTION, SOLUTION INFILTRATION; PERINEURAL at 15:15

## 2024-12-05 RX ADMIN — HYDROMORPHONE HYDROCHLORIDE 0.5 MG: 0.5 INJECTION, SOLUTION INTRAMUSCULAR; INTRAVENOUS; SUBCUTANEOUS at 17:43

## 2024-12-05 RX ADMIN — MIDAZOLAM 1 MG: 1 INJECTION INTRAMUSCULAR; INTRAVENOUS at 15:03

## 2024-12-05 RX ADMIN — DEXAMETHASONE SODIUM PHOSPHATE 8 MG: 4 INJECTION, SOLUTION INTRAMUSCULAR; INTRAVENOUS at 15:25

## 2024-12-05 RX ADMIN — SODIUM CHLORIDE, SODIUM LACTATE, POTASSIUM CHLORIDE, CALCIUM CHLORIDE 9 ML/HR: 20; 30; 600; 310 INJECTION, SOLUTION INTRAVENOUS at 14:06

## 2024-12-05 RX ADMIN — SODIUM CHLORIDE 2000 MG: 900 INJECTION INTRAVENOUS at 23:56

## 2024-12-05 RX ADMIN — CETIRIZINE HYDROCHLORIDE 10 MG: 10 TABLET, FILM COATED ORAL at 21:36

## 2024-12-05 RX ADMIN — HYDROCODONE BITARTRATE AND ACETAMINOPHEN 1 TABLET: 7.5; 325 TABLET ORAL at 21:36

## 2024-12-05 RX ADMIN — FENTANYL CITRATE 50 MCG: 50 INJECTION, SOLUTION INTRAMUSCULAR; INTRAVENOUS at 15:15

## 2024-12-05 RX ADMIN — PHENYLEPHRINE HYDROCHLORIDE 0.5 MCG/KG/MIN: 10 INJECTION, SOLUTION INTRAVENOUS at 15:45

## 2024-12-05 RX ADMIN — PHENYLEPHRINE HYDROCHLORIDE 100 MCG: 50 INJECTION INTRAVENOUS at 15:32

## 2024-12-05 RX ADMIN — KETOROLAC TROMETHAMINE 30 MG: 30 INJECTION, SOLUTION INTRAMUSCULAR at 16:27

## 2024-12-05 RX ADMIN — FAMOTIDINE 20 MG: 10 INJECTION INTRAVENOUS at 14:26

## 2024-12-05 RX ADMIN — ONDANSETRON 4 MG: 2 INJECTION INTRAMUSCULAR; INTRAVENOUS at 16:25

## 2024-12-05 RX ADMIN — SPIRONOLACTONE 100 MG: 50 TABLET, FILM COATED ORAL at 21:35

## 2024-12-05 RX ADMIN — SODIUM CHLORIDE, SODIUM LACTATE, POTASSIUM CHLORIDE, CALCIUM CHLORIDE: 20; 30; 600; 310 INJECTION, SOLUTION INTRAVENOUS at 16:35

## 2024-12-05 RX ADMIN — PHENYLEPHRINE HYDROCHLORIDE 100 MCG: 50 INJECTION INTRAVENOUS at 15:20

## 2024-12-05 RX ADMIN — SUGAMMADEX 200 MG: 100 INJECTION, SOLUTION INTRAVENOUS at 16:55

## 2024-12-05 RX ADMIN — HYDROMORPHONE HYDROCHLORIDE 0.5 MG: 0.5 INJECTION, SOLUTION INTRAMUSCULAR; INTRAVENOUS; SUBCUTANEOUS at 17:15

## 2024-12-05 RX ADMIN — HYDROMORPHONE HYDROCHLORIDE 0.5 MG: 0.5 INJECTION, SOLUTION INTRAMUSCULAR; INTRAVENOUS; SUBCUTANEOUS at 18:24

## 2024-12-05 RX ADMIN — SODIUM CHLORIDE 2000 MG: 900 INJECTION INTRAVENOUS at 15:03

## 2024-12-05 RX ADMIN — SODIUM CHLORIDE 75 ML/HR: 4.5 INJECTION, SOLUTION INTRAVENOUS at 21:08

## 2024-12-05 NOTE — ANESTHESIA PROCEDURE NOTES
Airway  Urgency: elective    Date/Time: 12/5/2024 3:16 PM  Airway not difficult    General Information and Staff    Patient location during procedure: OR  Anesthesiologist: Jani Carroll MD  CRNA/CAA: Femi Tyson CRNA    Indications and Patient Condition  Indications for airway management: airway protection    Preoxygenated: yes  MILS maintained throughout  Mask difficulty assessment: 1 - vent by mask    Final Airway Details  Final airway type: endotracheal airway      Successful airway: ETT  Cuffed: yes   Successful intubation technique: direct laryngoscopy  Endotracheal tube insertion site: oral  Blade: Rivera  Blade size: 3  ETT size (mm): 7.0  Cormack-Lehane Classification: grade I - full view of glottis  Placement verified by: chest auscultation and capnometry   Measured from: lips  ETT/EBT  to lips (cm): 20  Number of attempts at approach: 1  Assessment: lips, teeth, and gum same as pre-op and atraumatic intubation

## 2024-12-05 NOTE — ANESTHESIA PREPROCEDURE EVALUATION
Anesthesia Evaluation     NPO Solid Status: > 8 hours             Airway   Mallampati: III  TM distance: >3 FB  Neck ROM: full  Large neck circumference and No difficulty expected  Dental - normal exam     Pulmonary    Cardiovascular         Neuro/Psych  (+) headaches    ROS Comment: Pseudotumor cerebri  GI/Hepatic/Renal/Endo    (+) obesity, morbid obesity, GERD, renal disease-    Musculoskeletal     Abdominal    Substance History      OB/GYN          Other                      Anesthesia Plan    ASA 3     general     intravenous induction     Anesthetic plan, risks, benefits, and alternatives have been provided, discussed and informed consent has been obtained with: patient.    CODE STATUS:

## 2024-12-05 NOTE — H&P
FIRST UROLOGY CONSULT      Patient Identification:  NAME:  Star Rollins  Age:  34 y.o.   Sex:  female   :  1990   MRN:  [id number]     Chief complaint: Hydronephrosis    History of present illness:      History of right hydronephrosis UPJ obstruction.  Patient presents today for robotic pyeloplasty.    Past medical history:  Past Medical History:   Diagnosis Date    ADD (attention deficit disorder)     Anxiety     Generalized headaches     GERD (gastroesophageal reflux disease)     Hidradenitis     History of COVID-2022    Papilledema associated with increased intracranial pressure     PT STATES - INCREASED SPINAL FLUID PRESSING ON OPTIC NERVE - FOLLOWED BY OPTHOMOLOGIST    Seasonal allergies     UPJ obstruction, congenital        Past surgical history:  Past Surgical History:   Procedure Laterality Date    CHOLECYSTECTOMY N/A 2023    Procedure: CHOLECYSTECTOMY LAPAROSCOPIC WITH DAVINCI ROBOT with cholangiogram;  Surgeon: Boy Dsouza MD;  Location: [facility];  Service: Robotics - DaVinci;  Laterality: N/A;    TYMPANOSTOMY TUBE PLACEMENT      WISDOM TOOTH EXTRACTION         Allergies:  Patient has no known allergies.    Home medications:  Medications Prior to Admission   Medication Sig Dispense Refill Last Dose/Taking    acetaZOLAMIDE (DIAMOX) 250 MG tablet Take 1 tablet by mouth Daily.   2024    cetirizine (zyrTEC) 10 MG tablet Take 1 tablet by mouth Daily As Needed.   2024    fluticasone (FLONASE) 50 MCG/ACT nasal spray Administer  into the nostril(s) as directed by provider Daily As Needed.   Past Week    pantoprazole (PROTONIX) 40 MG EC tablet Take 1 tablet by mouth Daily. 90 tablet 0 2024    spironolactone (ALDACTONE) 100 MG tablet Take 1 tablet by mouth Every Night.   2024    clindamycin (CLINDAGEL) 1 % gel Apply  topically to the appropriate area as directed Daily As Needed.   More than a month    levonorgestrel (MIRENA) 20 MCG/24HR IUD MIRENA            Hospital medications:  ceFAZolin, 2,000 mg, Intravenous, Once  sodium chloride, 3 mL, Intravenous, Q12H      lactated ringers, 9 mL/hr, Last Rate: 9 mL/hr (24 1406)        fentanyl    lidocaine    midazolam    sodium chloride    Family history:  Family History   Problem Relation Age of Onset    Arthritis Mother     Asthma Mother     Gallbladder disease Mother     Diabetes Father     COPD Father     Hypertension Father     Alcohol abuse Father     Asthma Father     Depression Father     Early death Father     Mental illness Father     Gallbladder disease Father     Diabetes Maternal Uncle     Vision loss Maternal Uncle     Hypertension Maternal Uncle     Hypertension Maternal Uncle     Vision loss Maternal Uncle     Anxiety disorder Paternal Aunt     Depression Paternal Aunt     Diabetes Paternal Aunt     Hypertension Paternal Aunt     Mental illness Paternal Aunt     Developmental Disability Maternal Grandmother     COPD Maternal Grandmother     Cancer Maternal Grandmother     Diabetes Maternal Grandmother     Hypertension Maternal Grandmother     Developmental Disability Maternal Grandfather     Hypertension Maternal Grandfather     Diabetes Maternal Grandfather     Developmental Disability Paternal Grandmother     COPD Paternal Grandmother     Diabetes Paternal Grandmother     Hypertension Paternal Grandmother     Developmental Disability Paternal Grandfather     Malig Hyperthermia Neg Hx        Social history:  Social History     Tobacco Use    Smoking status: Never    Smokeless tobacco: Never   Vaping Use    Vaping status: Never Used   Substance Use Topics    Alcohol use: Not Currently    Drug use: No       Review of systems:      Positive for: Hydronephrosis  Negative for:  chest pain, cough, sob, o/w neg    Objective:  TMax 24 hours:   Temp (24hrs), Av °F (36.7 °C), Min:98 °F (36.7 °C), Max:98 °F (36.7 °C)      Vitals Ranges:   Temp:  [98 °F (36.7 °C)] 98 °F (36.7 °C)  Heart Rate:  [107]  107  Resp:  [16] 16  BP: (132)/(97) 132/97    Intake/Output Last 3 shifts:  No intake/output data recorded.     Physical Exam:    General Appearance:    Alert, cooperative, NAD   Back:     No CVA tenderness   Lungs:     Respirations unlabored, no wheezing    Heart:    RRR, intact peripheral pulses   Abdomen:     Soft, NDNT, no masses, no guarding   :    Pelvic not performed, bladder nondistended and nontender   Neuro/Psych:   Orientation intact, mood/affect pleasant       Results review:   I reviewed the patient's new clinical results.    Data review:  Lab Results (last 24 hours)       ** No results found for the last 24 hours. **             Imaging:  Imaging Results (Last 24 Hours)       ** No results found for the last 24 hours. **             Assessment:     Right UPJ obstruction.    Plan:     Robotic right pyeloplasty.  Risk and benefits were explained to include but not limited to bleeding, infection, recurrence and chronic pain.  Notable procedures were discussed.  Patient consented to the procedure.    Po Bridges MD  12/05/24  14:27 EST

## 2024-12-05 NOTE — OP NOTE
PREOPERATIVE DIAGNOSIS: Right UPJ obstruction    POSTOPERATIVE DIAGNOSIS: Same    PROCEDURE: Robotic right pyeloplasty    SURGEON:  Po Bridges MD    ASSISTANT: Negrita Mark    ANESTHESIA: General    EBL: 20 cc    DRAINS: 16 Greek Pratt catheter, 15 Greek Jenaro drain, 6 x 26 cm double-J ureteral stent.    COMPLICATIONS: None    FINDINGS: Right UPJ obstruction no crossing vessel    INDICATIONS FOR PROCEDURE: History of right UPJ obstruction with documented obstruction on Lasix renogram.  Patient presents today for robotic pyeloplasty.  Risk and benefits were explained include but not limited to bleeding, infection, recurrence chronic pain.  Patient consented to the procedure    DESCRIPTION OF PROCEDURE: After receiving antibiotics he was taken to the operative suite placed in supine position the operating table.  She underwent a general anesthesia.  After adequate esthesia was obtained a 16 Greek Pratt catheter was placed.  10 cc were placed in the balloon Pratt bag was placed.  She was then placed in left lateral decubitus position with right side up.  Axillary roll was placed.  All of her pressure points were padded accordingly.  Flex was placed into the table and she was taped to the table.  Her abdomen and right flank were prepped and draped in sterile fashion.  Using a 15 blade knife I made a punch incision 2 fingerbreadths superior to the umbilicus on the right midclavicular line.  Towel clips were placed in the side of the incision and lifted anteriorly.  A Veress needle was placed into the abdomen and saline test was performed which was negative.  CO2 gas was turned on and pneumoperitoneum was achieved.  Using the Optiview I placed an 8 mm port of the abdomen.  There is no vessel bowel injury.  No scarring or adhesions were noted.  I then placed an 8 mm port 4 fingerbreadth superior 4 fingerbreadths inferior and 8 fingerbreadths in area along the midclavicular line from the initial incision.  These were  all done with visual guidance.  I then made a 12 mm incision with a 15 blade knife 4 fingerbreadths above the umbilicus in the midline.  A 12 mm port was placed with visual guidance.  Table was dropped robot was docked.    I went to the console and using third-degree down lens I incised the white line of Toldt and reflected the colon medially.  I dissected down was able to identify the ureter at the lower pole of the kidney.  I dissected up and found the renal pelvis.  There was no crossing vessel but there appeared to be a stricture at the UPJ.  I dissected the renal pelvis is much as possible to get a little more length.  Care was taken not to dissect the ureter.  I then transected the ureter at the UPJ with scissors.  A small amount of UPJ was sent off specimen.  I then spatulated the ureter and spatulated the UPJ.  The tissues look very healthy.  I then began the anastomosis with a 4-0 Monocryl in a running fashion starting posterior.  Once the posterior suture was in place I then placed a guidewire and a 6-26 double-J ureteral stent over the guidewire in antegrade fashion.  The proximal aspect was placed back into the renal pelvis with good coil.  Appear to be no tension on the ureter.  I then ran the anterior portion of the closure with another 4-0 Monocryl in a running fashion.  Appeared to be watertight closure.  I dropped the gas pressure down to 5 cmH2O there is no active bleeding noted.  Floseal and Tisseel were placed over the anastomosis.  A 15 Jenaro drain was placed to the first arm sewn with a 2-0 silk.  Gas turned off robot was then undocked.  The ports were removed.  I then placed a single 0 Vicryl suture where the 12 mm port was in the fascia.  All skin incisions were infiltrated core percent Marcaine without epinephrine for local anesthetic.  All skin incisions were closed with 4-0 Monocryl subcu running sutures.  Dermabond was placed.  Dressing sponge and paper tape were placed to JOHN site.  She  was extubated taken recovery in satisfactory condition.  She tolerated the procedure well.  All instrument needle counts were correct.

## 2024-12-06 VITALS
HEART RATE: 61 BPM | DIASTOLIC BLOOD PRESSURE: 79 MMHG | TEMPERATURE: 97 F | RESPIRATION RATE: 16 BRPM | OXYGEN SATURATION: 95 % | BODY MASS INDEX: 48.12 KG/M2 | SYSTOLIC BLOOD PRESSURE: 121 MMHG | HEIGHT: 62 IN

## 2024-12-06 LAB
ANION GAP SERPL CALCULATED.3IONS-SCNC: 10 MMOL/L (ref 5–15)
BUN SERPL-MCNC: 18 MG/DL (ref 6–20)
BUN/CREAT SERPL: 16.5 (ref 7–25)
CALCIUM SPEC-SCNC: 8.8 MG/DL (ref 8.6–10.5)
CHLORIDE SERPL-SCNC: 101 MMOL/L (ref 98–107)
CO2 SERPL-SCNC: 21 MMOL/L (ref 22–29)
CREAT SERPL-MCNC: 1.09 MG/DL (ref 0.57–1)
CYTO UR: NORMAL
DEPRECATED RDW RBC AUTO: 38.7 FL (ref 37–54)
EGFRCR SERPLBLD CKD-EPI 2021: 68.9 ML/MIN/1.73
ERYTHROCYTE [DISTWIDTH] IN BLOOD BY AUTOMATED COUNT: 11.7 % (ref 12.3–15.4)
GLUCOSE SERPL-MCNC: 143 MG/DL (ref 65–99)
HCT VFR BLD AUTO: 40.8 % (ref 34–46.6)
HGB BLD-MCNC: 13.8 G/DL (ref 12–15.9)
LAB AP CASE REPORT: NORMAL
MCH RBC QN AUTO: 30.6 PG (ref 26.6–33)
MCHC RBC AUTO-ENTMCNC: 33.8 G/DL (ref 31.5–35.7)
MCV RBC AUTO: 90.5 FL (ref 79–97)
PATH REPORT.FINAL DX SPEC: NORMAL
PATH REPORT.GROSS SPEC: NORMAL
PLATELET # BLD AUTO: 358 10*3/MM3 (ref 140–450)
PMV BLD AUTO: 9.8 FL (ref 6–12)
POTASSIUM SERPL-SCNC: 4.4 MMOL/L (ref 3.5–5.2)
RBC # BLD AUTO: 4.51 10*6/MM3 (ref 3.77–5.28)
SODIUM SERPL-SCNC: 132 MMOL/L (ref 136–145)
WBC NRBC COR # BLD AUTO: 12.43 10*3/MM3 (ref 3.4–10.8)

## 2024-12-06 PROCEDURE — G0378 HOSPITAL OBSERVATION PER HR: HCPCS

## 2024-12-06 PROCEDURE — 25010000002 CEFAZOLIN PER 500 MG: Performed by: UROLOGY

## 2024-12-06 PROCEDURE — 80048 BASIC METABOLIC PNL TOTAL CA: CPT | Performed by: UROLOGY

## 2024-12-06 PROCEDURE — 85027 COMPLETE CBC AUTOMATED: CPT | Performed by: UROLOGY

## 2024-12-06 RX ADMIN — ACETAZOLAMIDE 250 MG: 250 TABLET ORAL at 08:58

## 2024-12-06 RX ADMIN — PANTOPRAZOLE SODIUM 40 MG: 40 TABLET, DELAYED RELEASE ORAL at 08:57

## 2024-12-06 RX ADMIN — CETIRIZINE HYDROCHLORIDE 10 MG: 10 TABLET, FILM COATED ORAL at 08:57

## 2024-12-06 RX ADMIN — SODIUM CHLORIDE 2000 MG: 900 INJECTION INTRAVENOUS at 07:24

## 2024-12-06 RX ADMIN — HYDROCODONE BITARTRATE AND ACETAMINOPHEN 1 TABLET: 7.5; 325 TABLET ORAL at 11:33

## 2024-12-06 RX ADMIN — HYDROCODONE BITARTRATE AND ACETAMINOPHEN 1 TABLET: 7.5; 325 TABLET ORAL at 07:23

## 2024-12-06 NOTE — PROGRESS NOTES
Continued Stay Note  [facility]     Patient Name: Star Rollins  MRN: [id number]  Today's Date: 12/6/2024    Admit Date: 12/5/2024    Plan: Home no needs   Discharge Plan       Row Name 12/06/24 0929       Plan    Plan Home no needs    Plan Comments Discharge order noted. Met with patient who confirmed DC plan is to return home. Mother will assist as needed and will provide transportation at DC. Denies any needs/equipment.                   Discharge Codes    No documentation.                 Expected Discharge Date and Time       Expected Discharge Date Expected Discharge Time    Dec 6, 2024               Ashley Jiang RN

## 2024-12-06 NOTE — PROGRESS NOTES
Case Management Discharge Note      Final Note: Discharged home. Ashley Jiang RN         Selected Continued Care - Discharged on 12/6/2024 Admission date: 12/5/2024 - Discharge disposition: Home or Self Care       Transportation Services  Private: Car    Final Discharge Disposition Code: 01 - home or self-care

## 2024-12-06 NOTE — ANESTHESIA POSTPROCEDURE EVALUATION
"Patient: Star Rollins    Procedure Summary       Date: 12/05/24 Room / Location: [facility] OR [address] [place] [address] MAIN OR    Anesthesia Start: 1510 Anesthesia Stop: 1707    Procedure: RIGHT ROBOTIC PYLEOPLASTY (Right: Abdomen) Diagnosis:     Surgeons: Po Bridges MD Provider: Jaydon Rivero MD    Anesthesia Type: general ASA Status: 3            Anesthesia Type: general    Vitals  Vitals Value Taken Time   /76 12/05/24 1930   Temp 36.8 °C (98.3 °F) 12/05/24 1830   Pulse 86 12/05/24 1940   Resp 16 12/05/24 1930   SpO2 98 % 12/05/24 1940   Vitals shown include unfiled device data.        Post Anesthesia Care and Evaluation    Patient location during evaluation: bedside  Patient participation: complete - patient participated  Level of consciousness: awake and alert  Pain management: adequate    Airway patency: patent  Anesthetic complications: No anesthetic complications    Cardiovascular status: acceptable  Respiratory status: acceptable  Hydration status: acceptable    Comments: /79 (BP Location: Right arm, Patient Position: Lying)   Pulse 80   Temp 37 °C (98.6 °F) (Oral)   Resp 16   Ht 157.5 cm (62\")   SpO2 95%   BMI 48.12 kg/m²     " Curette Before Application?: No Medical Necessity Information: It is in your best interest to select a reason for this procedure from the list below. All of these items fulfill various CMS LCD requirements except the new and changing color options. Detail Level: Simple Medical Necessity Clause: This procedure was medically necessary because the lesions that were treated were: Strength: [facility] plus Total Number Of Lesions Treated: 10

## 2024-12-06 NOTE — PLAN OF CARE
Goal Outcome Evaluation:  Plan of Care Reviewed With: patient           Outcome Evaluation: Pt ambulated in the mensah for 800 feet. Voiding W/O difficulty. Tolerating liquids and solid foods. Pain is controlled with pain meds.  To be discharged soon.

## 2024-12-06 NOTE — PLAN OF CARE
Goal Outcome Evaluation:  Plan of Care Reviewed With: patient, parent        Progress: improving  Outcome Evaluation: Lap sites x4 intact with surgical glue. Lisa x1 with serosanginous drainage. f/C to bsd. Order to remove at 0600. Afebrile. Little bradycardic. taking po without nausea. Mother at bedside. Norco for pain.

## 2024-12-06 NOTE — PROGRESS NOTES
[facility]  POd#1  Pain controlled.  Looks good.  Catheter out.    Abd soft, incisions C/D/I.    A/p - s/p Robotic R pyeloplasty.  D/c JOHN drain.  D/c to home.  F/u Dr. Bridges 1 week.

## 2024-12-09 ENCOUNTER — APPOINTMENT (OUTPATIENT)
Dept: CT IMAGING | Facility: HOSPITAL | Age: 34
End: 2024-12-09
Payer: COMMERCIAL

## 2024-12-09 ENCOUNTER — HOSPITAL ENCOUNTER (OUTPATIENT)
Facility: HOSPITAL | Age: 34
Setting detail: OBSERVATION
Discharge: HOME | End: 2024-12-11
Attending: EMERGENCY MEDICINE | Admitting: HOSPITALIST
Payer: COMMERCIAL

## 2024-12-09 DIAGNOSIS — R93.89 ABNORMAL CT SCAN: ICD-10-CM

## 2024-12-09 DIAGNOSIS — N13.30 HYDRONEPHROSIS, UNSPECIFIED HYDRONEPHROSIS TYPE: ICD-10-CM

## 2024-12-09 DIAGNOSIS — N17.9 AKI (ACUTE KIDNEY INJURY): Primary | ICD-10-CM

## 2024-12-09 PROBLEM — R10.9 ABDOMINAL PAIN: Status: ACTIVE | Noted: 2024-12-09

## 2024-12-09 LAB
ALBUMIN SERPL-MCNC: 3.6 G/DL (ref 3.5–5.2)
ALBUMIN/GLOB SERPL: 0.9 G/DL
ALP SERPL-CCNC: 54 U/L (ref 39–117)
ALT SERPL W P-5'-P-CCNC: 83 U/L (ref 1–33)
ANION GAP SERPL CALCULATED.3IONS-SCNC: 11.7 MMOL/L (ref 5–15)
AST SERPL-CCNC: 48 U/L (ref 1–32)
BACTERIA UR QL AUTO: ABNORMAL /HPF
BASOPHILS # BLD AUTO: 0.05 10*3/MM3 (ref 0–0.2)
BASOPHILS NFR BLD AUTO: 0.3 % (ref 0–1.5)
BILIRUB SERPL-MCNC: 1 MG/DL (ref 0–1.2)
BILIRUB UR QL STRIP: NEGATIVE
BUN SERPL-MCNC: 16 MG/DL (ref 6–20)
BUN/CREAT SERPL: 10.6 (ref 7–25)
CALCIUM SPEC-SCNC: 9.1 MG/DL (ref 8.6–10.5)
CHLORIDE SERPL-SCNC: 98 MMOL/L (ref 98–107)
CLARITY UR: ABNORMAL
CO2 SERPL-SCNC: 22.3 MMOL/L (ref 22–29)
COD CRY URNS QL: ABNORMAL /HPF
COLOR UR: ABNORMAL
CREAT SERPL-MCNC: 1.51 MG/DL (ref 0.57–1)
CREAT UR-MCNC: 73.1 MG/DL
DEPRECATED RDW RBC AUTO: 38.7 FL (ref 37–54)
EGFRCR SERPLBLD CKD-EPI 2021: 46.6 ML/MIN/1.73
EOSINOPHIL # BLD AUTO: 0.03 10*3/MM3 (ref 0–0.4)
EOSINOPHIL NFR BLD AUTO: 0.2 % (ref 0.3–6.2)
EOSINOPHIL SPEC QL MICRO: 0 % EOS/100 CELLS (ref 0–0)
ERYTHROCYTE [DISTWIDTH] IN BLOOD BY AUTOMATED COUNT: 11.7 % (ref 12.3–15.4)
GLOBULIN UR ELPH-MCNC: 3.9 GM/DL
GLUCOSE SERPL-MCNC: 118 MG/DL (ref 65–99)
GLUCOSE UR STRIP-MCNC: NEGATIVE MG/DL
HCT VFR BLD AUTO: 40.7 % (ref 34–46.6)
HGB BLD-MCNC: 13.5 G/DL (ref 12–15.9)
HGB UR QL STRIP.AUTO: ABNORMAL
HYALINE CASTS UR QL AUTO: ABNORMAL /LPF
IMM GRANULOCYTES # BLD AUTO: 0.07 10*3/MM3 (ref 0–0.05)
IMM GRANULOCYTES NFR BLD AUTO: 0.5 % (ref 0–0.5)
KETONES UR QL STRIP: ABNORMAL
LEUKOCYTE ESTERASE UR QL STRIP.AUTO: ABNORMAL
LIPASE SERPL-CCNC: 9 U/L (ref 13–60)
LYMPHOCYTES # BLD AUTO: 1.11 10*3/MM3 (ref 0.7–3.1)
LYMPHOCYTES NFR BLD AUTO: 7.7 % (ref 19.6–45.3)
MCH RBC QN AUTO: 30.1 PG (ref 26.6–33)
MCHC RBC AUTO-ENTMCNC: 33.2 G/DL (ref 31.5–35.7)
MCV RBC AUTO: 90.6 FL (ref 79–97)
MONOCYTES # BLD AUTO: 0.96 10*3/MM3 (ref 0.1–0.9)
MONOCYTES NFR BLD AUTO: 6.7 % (ref 5–12)
NEUTROPHILS NFR BLD AUTO: 12.14 10*3/MM3 (ref 1.7–7)
NEUTROPHILS NFR BLD AUTO: 84.6 % (ref 42.7–76)
NITRITE UR QL STRIP: NEGATIVE
NRBC BLD AUTO-RTO: 0 /100 WBC (ref 0–0.2)
OSMOLALITY UR: 510 MOSM/KG
PH UR STRIP.AUTO: 5.5 [PH] (ref 5–8)
PLATELET # BLD AUTO: 318 10*3/MM3 (ref 140–450)
PMV BLD AUTO: 9.5 FL (ref 6–12)
POTASSIUM SERPL-SCNC: 4.1 MMOL/L (ref 3.5–5.2)
PROT SERPL-MCNC: 7.5 G/DL (ref 6–8.5)
PROT UR QL STRIP: ABNORMAL
QT INTERVAL: 299 MS
QTC INTERVAL: 433 MS
RBC # BLD AUTO: 4.49 10*6/MM3 (ref 3.77–5.28)
RBC # UR STRIP: ABNORMAL /HPF
REF LAB TEST METHOD: ABNORMAL
SODIUM SERPL-SCNC: 132 MMOL/L (ref 136–145)
SODIUM UR-SCNC: 138 MMOL/L
SP GR UR STRIP: 1.02 (ref 1–1.03)
SQUAMOUS #/AREA URNS HPF: ABNORMAL /HPF
TSH SERPL DL<=0.05 MIU/L-ACNC: 1.99 UIU/ML (ref 0.27–4.2)
UROBILINOGEN UR QL STRIP: ABNORMAL
WBC # UR STRIP: ABNORMAL /HPF
WBC NRBC COR # BLD AUTO: 14.36 10*3/MM3 (ref 3.4–10.8)

## 2024-12-09 PROCEDURE — 25010000002 MORPHINE PER 10 MG: Performed by: EMERGENCY MEDICINE

## 2024-12-09 PROCEDURE — G0378 HOSPITAL OBSERVATION PER HR: HCPCS

## 2024-12-09 PROCEDURE — 96365 THER/PROPH/DIAG IV INF INIT: CPT

## 2024-12-09 PROCEDURE — 99285 EMERGENCY DEPT VISIT HI MDM: CPT

## 2024-12-09 PROCEDURE — 82570 ASSAY OF URINE CREATININE: CPT | Performed by: HOSPITALIST

## 2024-12-09 PROCEDURE — 25010000002 HYDROMORPHONE 1 MG/ML SOLUTION: Performed by: UROLOGY

## 2024-12-09 PROCEDURE — 93010 ELECTROCARDIOGRAM REPORT: CPT | Performed by: INTERNAL MEDICINE

## 2024-12-09 PROCEDURE — 87205 SMEAR GRAM STAIN: CPT | Performed by: HOSPITALIST

## 2024-12-09 PROCEDURE — 87040 BLOOD CULTURE FOR BACTERIA: CPT | Performed by: PHYSICIAN ASSISTANT

## 2024-12-09 PROCEDURE — 83690 ASSAY OF LIPASE: CPT | Performed by: EMERGENCY MEDICINE

## 2024-12-09 PROCEDURE — 93005 ELECTROCARDIOGRAM TRACING: CPT | Performed by: HOSPITALIST

## 2024-12-09 PROCEDURE — 84443 ASSAY THYROID STIM HORMONE: CPT | Performed by: HOSPITALIST

## 2024-12-09 PROCEDURE — 83935 ASSAY OF URINE OSMOLALITY: CPT | Performed by: HOSPITALIST

## 2024-12-09 PROCEDURE — 80053 COMPREHEN METABOLIC PANEL: CPT | Performed by: EMERGENCY MEDICINE

## 2024-12-09 PROCEDURE — 36415 COLL VENOUS BLD VENIPUNCTURE: CPT | Performed by: PHYSICIAN ASSISTANT

## 2024-12-09 PROCEDURE — 25810000003 SODIUM CHLORIDE 0.9 % SOLUTION: Performed by: HOSPITALIST

## 2024-12-09 PROCEDURE — 25010000002 CEFTRIAXONE PER 250 MG: Performed by: PHYSICIAN ASSISTANT

## 2024-12-09 PROCEDURE — 81001 URINALYSIS AUTO W/SCOPE: CPT | Performed by: EMERGENCY MEDICINE

## 2024-12-09 PROCEDURE — 84300 ASSAY OF URINE SODIUM: CPT | Performed by: HOSPITALIST

## 2024-12-09 PROCEDURE — 25510000001 IOPAMIDOL PER 1 ML: Performed by: HOSPITALIST

## 2024-12-09 PROCEDURE — 87086 URINE CULTURE/COLONY COUNT: CPT | Performed by: HOSPITALIST

## 2024-12-09 PROCEDURE — 25010000002 HYDROMORPHONE PER 4 MG: Performed by: UROLOGY

## 2024-12-09 PROCEDURE — 25010000002 FENTANYL CITRATE (PF) 50 MCG/ML SOLUTION: Performed by: EMERGENCY MEDICINE

## 2024-12-09 PROCEDURE — 25010000002 HYDROMORPHONE PER 4 MG: Performed by: HOSPITALIST

## 2024-12-09 PROCEDURE — 96376 TX/PRO/DX INJ SAME DRUG ADON: CPT

## 2024-12-09 PROCEDURE — 96375 TX/PRO/DX INJ NEW DRUG ADDON: CPT

## 2024-12-09 PROCEDURE — 74176 CT ABD & PELVIS W/O CONTRAST: CPT

## 2024-12-09 PROCEDURE — 85025 COMPLETE CBC W/AUTO DIFF WBC: CPT | Performed by: EMERGENCY MEDICINE

## 2024-12-09 PROCEDURE — 71275 CT ANGIOGRAPHY CHEST: CPT

## 2024-12-09 PROCEDURE — 25010000002 ONDANSETRON PER 1 MG: Performed by: HOSPITALIST

## 2024-12-09 PROCEDURE — 25010000002 ONDANSETRON PER 1 MG: Performed by: EMERGENCY MEDICINE

## 2024-12-09 RX ORDER — ONDANSETRON 2 MG/ML
4 INJECTION INTRAMUSCULAR; INTRAVENOUS ONCE
Status: COMPLETED | OUTPATIENT
Start: 2024-12-09 | End: 2024-12-09

## 2024-12-09 RX ORDER — SODIUM CHLORIDE 9 MG/ML
100 INJECTION, SOLUTION INTRAVENOUS CONTINUOUS
Status: ACTIVE | OUTPATIENT
Start: 2024-12-09 | End: 2024-12-09

## 2024-12-09 RX ORDER — HYDROMORPHONE HYDROCHLORIDE 1 MG/ML
0.5 INJECTION, SOLUTION INTRAMUSCULAR; INTRAVENOUS; SUBCUTANEOUS
Status: DISCONTINUED | OUTPATIENT
Start: 2024-12-09 | End: 2024-12-09

## 2024-12-09 RX ORDER — MORPHINE SULFATE 2 MG/ML
4 INJECTION, SOLUTION INTRAMUSCULAR; INTRAVENOUS ONCE
Status: COMPLETED | OUTPATIENT
Start: 2024-12-09 | End: 2024-12-09

## 2024-12-09 RX ORDER — SODIUM CHLORIDE 0.9 % (FLUSH) 0.9 %
10 SYRINGE (ML) INJECTION AS NEEDED
Status: DISCONTINUED | OUTPATIENT
Start: 2024-12-09 | End: 2024-12-11 | Stop reason: HOSPADM

## 2024-12-09 RX ORDER — BISACODYL 5 MG/1
5 TABLET, DELAYED RELEASE ORAL DAILY PRN
Status: DISCONTINUED | OUTPATIENT
Start: 2024-12-09 | End: 2024-12-11 | Stop reason: HOSPADM

## 2024-12-09 RX ORDER — ONDANSETRON 4 MG/1
4 TABLET, ORALLY DISINTEGRATING ORAL EVERY 6 HOURS PRN
Status: DISCONTINUED | OUTPATIENT
Start: 2024-12-09 | End: 2024-12-11 | Stop reason: HOSPADM

## 2024-12-09 RX ORDER — HYDROMORPHONE HYDROCHLORIDE 1 MG/ML
0.5 INJECTION, SOLUTION INTRAMUSCULAR; INTRAVENOUS; SUBCUTANEOUS
Status: DISCONTINUED | OUTPATIENT
Start: 2024-12-09 | End: 2024-12-11 | Stop reason: HOSPADM

## 2024-12-09 RX ORDER — ACETAMINOPHEN 325 MG/1
650 TABLET ORAL EVERY 4 HOURS PRN
Status: DISCONTINUED | OUTPATIENT
Start: 2024-12-09 | End: 2024-12-11 | Stop reason: HOSPADM

## 2024-12-09 RX ORDER — AMOXICILLIN 250 MG
2 CAPSULE ORAL 2 TIMES DAILY
Status: DISCONTINUED | OUTPATIENT
Start: 2024-12-09 | End: 2024-12-11 | Stop reason: HOSPADM

## 2024-12-09 RX ORDER — OXYCODONE AND ACETAMINOPHEN 7.5; 325 MG/1; MG/1
1 TABLET ORAL EVERY 4 HOURS PRN
Status: DISCONTINUED | OUTPATIENT
Start: 2024-12-09 | End: 2024-12-11 | Stop reason: HOSPADM

## 2024-12-09 RX ORDER — SPIRONOLACTONE 50 MG/1
100 TABLET, FILM COATED ORAL NIGHTLY
Status: DISCONTINUED | OUTPATIENT
Start: 2024-12-09 | End: 2024-12-11 | Stop reason: HOSPADM

## 2024-12-09 RX ORDER — FENTANYL CITRATE 50 UG/ML
50 INJECTION, SOLUTION INTRAMUSCULAR; INTRAVENOUS ONCE
Status: COMPLETED | OUTPATIENT
Start: 2024-12-09 | End: 2024-12-09

## 2024-12-09 RX ORDER — HYDROCODONE BITARTRATE AND ACETAMINOPHEN 5; 325 MG/1; MG/1
1 TABLET ORAL EVERY 6 HOURS PRN
Status: DISCONTINUED | OUTPATIENT
Start: 2024-12-09 | End: 2024-12-11 | Stop reason: HOSPADM

## 2024-12-09 RX ORDER — POLYETHYLENE GLYCOL 3350 17 G/17G
17 POWDER, FOR SOLUTION ORAL DAILY PRN
Status: DISCONTINUED | OUTPATIENT
Start: 2024-12-09 | End: 2024-12-11 | Stop reason: HOSPADM

## 2024-12-09 RX ORDER — PANTOPRAZOLE SODIUM 40 MG/1
40 TABLET, DELAYED RELEASE ORAL
Status: DISCONTINUED | OUTPATIENT
Start: 2024-12-09 | End: 2024-12-11 | Stop reason: HOSPADM

## 2024-12-09 RX ORDER — IOPAMIDOL 755 MG/ML
100 INJECTION, SOLUTION INTRAVASCULAR
Status: COMPLETED | OUTPATIENT
Start: 2024-12-09 | End: 2024-12-09

## 2024-12-09 RX ORDER — SODIUM CHLORIDE 0.9 % (FLUSH) 0.9 %
10 SYRINGE (ML) INJECTION EVERY 12 HOURS SCHEDULED
Status: DISCONTINUED | OUTPATIENT
Start: 2024-12-09 | End: 2024-12-11 | Stop reason: HOSPADM

## 2024-12-09 RX ORDER — ONDANSETRON 2 MG/ML
4 INJECTION INTRAMUSCULAR; INTRAVENOUS EVERY 6 HOURS PRN
Status: DISCONTINUED | OUTPATIENT
Start: 2024-12-09 | End: 2024-12-11 | Stop reason: HOSPADM

## 2024-12-09 RX ORDER — ACETAZOLAMIDE 250 MG/1
250 TABLET ORAL DAILY
Status: DISCONTINUED | OUTPATIENT
Start: 2024-12-09 | End: 2024-12-11 | Stop reason: HOSPADM

## 2024-12-09 RX ORDER — SODIUM CHLORIDE 9 MG/ML
40 INJECTION, SOLUTION INTRAVENOUS AS NEEDED
Status: DISCONTINUED | OUTPATIENT
Start: 2024-12-09 | End: 2024-12-11 | Stop reason: HOSPADM

## 2024-12-09 RX ORDER — BISACODYL 10 MG
10 SUPPOSITORY, RECTAL RECTAL DAILY PRN
Status: DISCONTINUED | OUTPATIENT
Start: 2024-12-09 | End: 2024-12-11 | Stop reason: HOSPADM

## 2024-12-09 RX ADMIN — SENNOSIDES AND DOCUSATE SODIUM 2 TABLET: 50; 8.6 TABLET ORAL at 21:29

## 2024-12-09 RX ADMIN — ONDANSETRON 4 MG: 2 INJECTION, SOLUTION INTRAMUSCULAR; INTRAVENOUS at 06:19

## 2024-12-09 RX ADMIN — IOPAMIDOL 95 ML: 755 INJECTION, SOLUTION INTRAVENOUS at 13:57

## 2024-12-09 RX ADMIN — ACETAMINOPHEN 325MG 650 MG: 325 TABLET ORAL at 11:40

## 2024-12-09 RX ADMIN — HYDROMORPHONE HYDROCHLORIDE 0.5 MG: 1 INJECTION, SOLUTION INTRAMUSCULAR; INTRAVENOUS; SUBCUTANEOUS at 18:59

## 2024-12-09 RX ADMIN — ONDANSETRON 4 MG: 2 INJECTION, SOLUTION INTRAMUSCULAR; INTRAVENOUS at 11:57

## 2024-12-09 RX ADMIN — HYDROMORPHONE HYDROCHLORIDE 0.5 MG: 1 INJECTION, SOLUTION INTRAMUSCULAR; INTRAVENOUS; SUBCUTANEOUS at 11:56

## 2024-12-09 RX ADMIN — FENTANYL CITRATE 50 MCG: 50 INJECTION, SOLUTION INTRAMUSCULAR; INTRAVENOUS at 06:20

## 2024-12-09 RX ADMIN — OXYCODONE HYDROCHLORIDE AND ACETAMINOPHEN 1 TABLET: 7.5; 325 TABLET ORAL at 21:29

## 2024-12-09 RX ADMIN — HYDROMORPHONE HYDROCHLORIDE 1 MG: 1 INJECTION, SOLUTION INTRAMUSCULAR; INTRAVENOUS; SUBCUTANEOUS at 15:00

## 2024-12-09 RX ADMIN — HYDROCODONE BITARTRATE AND ACETAMINOPHEN 1 TABLET: 5; 325 TABLET ORAL at 09:28

## 2024-12-09 RX ADMIN — PANTOPRAZOLE SODIUM 40 MG: 40 TABLET, DELAYED RELEASE ORAL at 09:28

## 2024-12-09 RX ADMIN — SODIUM CHLORIDE 100 ML/HR: 9 INJECTION, SOLUTION INTRAVENOUS at 08:01

## 2024-12-09 RX ADMIN — OXYCODONE HYDROCHLORIDE AND ACETAMINOPHEN 1 TABLET: 7.5; 325 TABLET ORAL at 16:46

## 2024-12-09 RX ADMIN — MORPHINE SULFATE 4 MG: 2 INJECTION, SOLUTION INTRAMUSCULAR; INTRAVENOUS at 07:57

## 2024-12-09 RX ADMIN — CEFTRIAXONE 2000 MG: 2 INJECTION, POWDER, FOR SOLUTION INTRAMUSCULAR; INTRAVENOUS at 09:31

## 2024-12-09 NOTE — PROGRESS NOTES
Continued Stay Note  [facility]     Patient Name: Star Rollins  MRN: [id number]  Today's Date: 12/9/2024    Admit Date: 12/9/2024    Plan: Home no needs   Discharge Plan       Row Name 12/09/24 1437       Plan    Plan Home no needs    Plan Comments Introduced self and role of CCP. Patient confirmed DC plan is to return to home. Stated she is independent with ADL's and uses no DMEs. Family will assist as needed and will provide transportation at DC. Denies any needs/equipment.                   Discharge Codes    No documentation.                       Ashley Jiang RN

## 2024-12-09 NOTE — ED PROVIDER NOTES
EMERGENCY DEPARTMENT ENCOUNTER      Room Number:  04/04  PCP: Lucie Chung APRN  Independent Historians: Patient  Patient Care Team:  Lucie Chung APRN as PCP - General (Family Medicine)  Esau Streeter DO as Emergency Attending (Family Medicine)       HPI:  Chief Complaint: Abdominal pain    A complete HPI/ROS/PMH/PSH/SH/FH are unobtainable due to: None    Chronic or social conditions impacting patient care (Social Determinants of Health): None      Context: Star Rollins is a 34 y.o. female with a PMH significant for UPJ obstruction, pseudotumor cerebri, hidradenitis suppurativa who presents to the ED c/o acute pain post op day 4. Pt had a pyeloplasty and has been controlling her pain well with hydrocodone until last night, when she began experiencing intermittent cramping and sharp pain at her RUQ radiating to her back. Mother is at bedside and shares there is a stone located in her kidney that the surgeon was able to retreive during surgery. She is passing gas, had a BM yesterday, is tolerating food well and has been walking. No hematuria, paraesthesia, N/V/D, fever, headache, SOB or chest pain.       Upon review of prior external notes (non-ED) -and- Review of prior external test results outside of this encounter it appears the patient was evaluated in the office with general surgery for calculus of the gallbladder on 6/21/2023.  The patient had a normal urine pregnancy test on 12/5/2024 and a normal iron profile on 6/20/2024.      PAST MEDICAL HISTORY  Active Ambulatory Problems     Diagnosis Date Noted    Anxiety and depression 09/01/2017    Hidradenitis suppurativa 09/01/2017    Obesity 09/01/2017    Pseudotumor cerebri 07/06/2016    General medical exam 02/29/2016    Calculus of gallbladder without cholecystitis without obstruction 04/10/2023    Ureteropelvic junction (UPJ) obstruction, right 12/05/2024     Resolved Ambulatory Problems     Diagnosis Date Noted    No Resolved Ambulatory  Problems     Past Medical History:   Diagnosis Date    ADD (attention deficit disorder)     Anxiety     Generalized headaches     GERD (gastroesophageal reflux disease)     Hidradenitis     History of COVID-19 2022    Papilledema associated with increased intracranial pressure     Seasonal allergies     UPJ obstruction, congenital          PAST SURGICAL HISTORY  Past Surgical History:   Procedure Laterality Date    CHOLECYSTECTOMY N/A 6/9/2023    Procedure: CHOLECYSTECTOMY LAPAROSCOPIC WITH DAVINCI ROBOT with cholangiogram;  Surgeon: Boy Dsouza MD;  Location:  [facility] MAIN OR;  Service: Robotics - DaVinci;  Laterality: N/A;    PYELOPLASTY Right 12/5/2024    Procedure: RIGHT ROBOTIC PYLEOPLASTY;  Surgeon: Po Bridges MD;  Location: [facility] MAIN OR;  Service: Robotics - DaVinci;  Laterality: Right;    TYMPANOSTOMY TUBE PLACEMENT      WISDOM TOOTH EXTRACTION           FAMILY HISTORY  Family History   Problem Relation Age of Onset    Arthritis Mother     Asthma Mother     Gallbladder disease Mother     Diabetes Father     COPD Father     Hypertension Father     Alcohol abuse Father     Asthma Father     Depression Father     Early death Father     Mental illness Father     Gallbladder disease Father     Diabetes Maternal Uncle     Vision loss Maternal Uncle     Hypertension Maternal Uncle     Hypertension Maternal Uncle     Vision loss Maternal Uncle     Anxiety disorder Paternal Aunt     Depression Paternal Aunt     Diabetes Paternal Aunt     Hypertension Paternal Aunt     Mental illness Paternal Aunt     Developmental Disability Maternal Grandmother     COPD Maternal Grandmother     Cancer Maternal Grandmother     Diabetes Maternal Grandmother     Hypertension Maternal Grandmother     Developmental Disability Maternal Grandfather     Hypertension Maternal Grandfather     Diabetes Maternal Grandfather     Developmental Disability Paternal Grandmother     COPD Paternal Grandmother     Diabetes Paternal  Grandmother     Hypertension Paternal Grandmother     Developmental Disability Paternal Grandfather     Malig Hyperthermia Neg Hx          SOCIAL HISTORY  Social History     Socioeconomic History    Marital status: Single   Tobacco Use    Smoking status: Never    Smokeless tobacco: Never   Vaping Use    Vaping status: Never Used   Substance and Sexual Activity    Alcohol use: Not Currently    Drug use: No    Sexual activity: Not Currently     Partners: Male     Birth control/protection: I.U.D.         ALLERGIES  Patient has no known allergies.      REVIEW OF SYSTEMS  Included in HPI  All systems reviewed and negative except for those discussed in HPI.      PHYSICAL EXAM    I have reviewed the triage vital signs and nursing notes.    ED Triage Vitals   Temp Heart Rate Resp BP SpO2   12/09/24 0541 12/09/24 0541 12/09/24 0541 12/09/24 0542 12/09/24 0541   99.3 °F (37.4 °C) 112 18 136/84 98 %      Temp src Heart Rate Source Patient Position BP Location FiO2 (%)   12/09/24 0541 -- -- -- --   Tympanic           Physical Exam  Constitutional:       General: She is not in acute distress (Pt appears uncomfortable).     Appearance: Normal appearance. She is overweight.   HENT:      Head: Normocephalic and atraumatic.   Eyes:      General: No scleral icterus.     Conjunctiva/sclera: Conjunctivae normal.   Neck:      Trachea: No tracheal deviation.   Cardiovascular:      Rate and Rhythm: Normal rate and regular rhythm.   Pulmonary:      Effort: Pulmonary effort is normal.      Breath sounds: Normal breath sounds.   Abdominal:      Palpations: Abdomen is soft.      Tenderness: There is generalized abdominal tenderness and tenderness in the right lower quadrant.      Comments: Multiple port site incisions. No erythema, warmth or bruising    Musculoskeletal:         General: No deformity.      Cervical back: Normal range of motion.   Lymphadenopathy:      Cervical: No cervical adenopathy.   Skin:     General: Skin is warm and dry.    Neurological:      Mental Status: She is alert and oriented to person, place, and time.   Psychiatric:         Behavior: Behavior normal. Behavior is cooperative.         Vital signs and nursing notes reviewed.      PPE: I wore a surgical mask throughout my encounters with the pt. I performed hand hygiene on entry into the pt room and upon exit.     LAB RESULTS  Recent Results (from the past 24 hours)   Urinalysis With Microscopic If Indicated (No Culture) - Urine, Clean Catch    Collection Time: 12/09/24  6:07 AM    Specimen: Urine, Clean Catch   Result Value Ref Range    Color, UA Dark Yellow (A) Yellow, Straw    Appearance, UA Cloudy (A) Clear    pH, UA 5.5 5.0 - 8.0    Specific Gravity, UA 1.023 1.005 - 1.030    Glucose, UA Negative Negative    Ketones, UA Trace (A) Negative    Bilirubin, UA Negative Negative    Blood, UA Large (3+) (A) Negative    Protein,  mg/dL (2+) (A) Negative    Leuk Esterase, UA Moderate (2+) (A) Negative    Nitrite, UA Negative Negative    Urobilinogen, UA 1.0 E.U./dL 0.2 - 1.0 E.U./dL   Urinalysis, Microscopic Only - Urine, Clean Catch    Collection Time: 12/09/24  6:07 AM    Specimen: Urine, Clean Catch   Result Value Ref Range    RBC, UA Too Numerous to Count (A) None Seen, 0-2 /HPF    WBC, UA 11-20 (A) None Seen, 0-2 /HPF    Bacteria, UA Trace (A) None Seen /HPF    Squamous Epithelial Cells, UA 3-6 (A) None Seen, 0-2 /HPF    Hyaline Casts, UA None Seen None Seen /LPF    Calcium Oxalate Crystals, UA Small/1+ None Seen /HPF    Methodology Automated Microscopy    Comprehensive Metabolic Panel    Collection Time: 12/09/24  6:13 AM    Specimen: Blood   Result Value Ref Range    Glucose 118 (H) 65 - 99 mg/dL    BUN 16 6 - 20 mg/dL    Creatinine 1.51 (H) 0.57 - 1.00 mg/dL    Sodium 132 (L) 136 - 145 mmol/L    Potassium 4.1 3.5 - 5.2 mmol/L    Chloride 98 98 - 107 mmol/L    CO2 22.3 22.0 - 29.0 mmol/L    Calcium 9.1 8.6 - 10.5 mg/dL    Total Protein 7.5 6.0 - 8.5 g/dL    Albumin 3.6  3.5 - 5.2 g/dL    ALT (SGPT) 83 (H) 1 - 33 U/L    AST (SGOT) 48 (H) 1 - 32 U/L    Alkaline Phosphatase 54 39 - 117 U/L    Total Bilirubin 1.0 0.0 - 1.2 mg/dL    Globulin 3.9 gm/dL    A/G Ratio 0.9 g/dL    BUN/Creatinine Ratio 10.6 7.0 - 25.0    Anion Gap 11.7 5.0 - 15.0 mmol/L    eGFR 46.6 (L) >60.0 mL/min/1.73   Lipase    Collection Time: 12/09/24  6:13 AM    Specimen: Blood   Result Value Ref Range    Lipase 9 (L) 13 - 60 U/L   CBC Auto Differential    Collection Time: 12/09/24  6:13 AM    Specimen: Blood   Result Value Ref Range    WBC 14.36 (H) 3.40 - 10.80 10*3/mm3    RBC 4.49 3.77 - 5.28 10*6/mm3    Hemoglobin 13.5 12.0 - 15.9 g/dL    Hematocrit 40.7 34.0 - 46.6 %    MCV 90.6 79.0 - 97.0 fL    MCH 30.1 26.6 - 33.0 pg    MCHC 33.2 31.5 - 35.7 g/dL    RDW 11.7 (L) 12.3 - 15.4 %    RDW-SD 38.7 37.0 - 54.0 fl    MPV 9.5 6.0 - 12.0 fL    Platelets 318 140 - 450 10*3/mm3    Neutrophil % 84.6 (H) 42.7 - 76.0 %    Lymphocyte % 7.7 (L) 19.6 - 45.3 %    Monocyte % 6.7 5.0 - 12.0 %    Eosinophil % 0.2 (L) 0.3 - 6.2 %    Basophil % 0.3 0.0 - 1.5 %    Immature Grans % 0.5 0.0 - 0.5 %    Neutrophils, Absolute 12.14 (H) 1.70 - 7.00 10*3/mm3    Lymphocytes, Absolute 1.11 0.70 - 3.10 10*3/mm3    Monocytes, Absolute 0.96 (H) 0.10 - 0.90 10*3/mm3    Eosinophils, Absolute 0.03 0.00 - 0.40 10*3/mm3    Basophils, Absolute 0.05 0.00 - 0.20 10*3/mm3    Immature Grans, Absolute 0.07 (H) 0.00 - 0.05 10*3/mm3    nRBC 0.0 0.0 - 0.2 /100 WBC         RADIOLOGY  CT Abdomen Pelvis Stone Protocol    Result Date: 12/9/2024  CT ABDOMEN AND PELVIS WITHOUT IV CONTRAST  HISTORY: 34-year-old female status post right robotic pyeloplasty on 12/05/2024. Right flank pain. Cholecystectomy in the past.  TECHNIQUE: Radiation dose reduction techniques were utilized, including automated exposure control and exposure modulation based on body size. 3 mm images were obtained through the abdomen and pelvis without the administration of IV contrast. No prior CT  for comparison.  FINDINGS: 1. The right renal pelvis is markedly dilated with right ureteral stent in place. Right renal pelvis is distended with fluid measuring 5.3 cm in transverse diameter. Probable iatrogenic bubble of air at the right renal pelvis and within the urinary bladder. There is moderate right perinephric and periureteral stranding and there is a tiny sliver of perihepatic fluid as well. There is a very small amount of free fluid within the pelvis which is within physiological range and may be related to rupture of an ovarian cyst or follicle. 2. No acute abnormality is seen at the noncontrasted spleen, pancreas, adrenals, or left kidney. There is no acute bowel abnormality. Appendix is within normal limits. IUD is present centrally within the uterus. Adnexa appear unremarkable. 3. No pleural effusions or convincing evidence for pneumonia at the visualized lung bases.          MEDICATIONS GIVEN IN ER  Medications   sodium chloride 0.9 % flush 10 mL (has no administration in time range)   cefTRIAXone (ROCEPHIN) 2,000 mg in sodium chloride 0.9 % 100 mL MBP (has no administration in time range)   morphine injection 4 mg (has no administration in time range)   fentaNYL citrate (PF) (SUBLIMAZE) injection 50 mcg (50 mcg Intravenous Given 12/9/24 0620)   ondansetron (ZOFRAN) injection 4 mg (4 mg Intravenous Given 12/9/24 0619)         ORDERS PLACED DURING THIS VISIT:  Orders Placed This Encounter   Procedures    Blood Culture - Blood,    Blood Culture - Blood,    CT Abdomen Pelvis Stone Protocol    Comprehensive Metabolic Panel    Urinalysis With Microscopic If Indicated (No Culture) - Urine, Clean Catch    Lipase    CBC Auto Differential    Urinalysis, Microscopic Only - Urine, Clean Catch    Monitor Blood Pressure    Continuous Pulse Oximetry    Urology (on-call MD unless specified)    [facility] (on-call MD unless specified) Details    Insert Peripheral IV    Inpatient Admission    CBC & Differential          OUTPATIENT MEDICATION MANAGEMENT:  Current Facility-Administered Medications Ordered in Epic   Medication Dose Route Frequency Provider Last Rate Last Admin    cefTRIAXone (ROCEPHIN) 2,000 mg in sodium chloride 0.9 % 100 mL MBP  2,000 mg Intravenous Once Dane Nguyen PA        morphine injection 4 mg  4 mg Intravenous Once Erickson Medina MD        sodium chloride 0.9 % flush 10 mL  10 mL Intravenous PRN Artie Rivero MD         Current Outpatient Medications Ordered in Epic   Medication Sig Dispense Refill    acetaZOLAMIDE (DIAMOX) 250 MG tablet Take 1 tablet by mouth Daily.      cephalexin (Keflex) 500 MG capsule Take 1 capsule by mouth 2 (Two) Times a Day. 10 capsule 0    cetirizine (zyrTEC) 10 MG tablet Take 1 tablet by mouth Daily As Needed.      clindamycin (CLINDAGEL) 1 % gel Apply  topically to the appropriate area as directed Daily As Needed.      fluticasone (FLONASE) 50 MCG/ACT nasal spray Administer  into the nostril(s) as directed by provider Daily As Needed.      HYDROcodone-acetaminophen (Norco) 5-325 MG per tablet Take 1 tablet by mouth Every 6 (Six) Hours As Needed for Moderate Pain. 20 tablet 0    levonorgestrel (MIRENA) 20 MCG/24HR IUD MIRENA      pantoprazole (PROTONIX) 40 MG EC tablet Take 1 tablet by mouth Daily. 90 tablet 0    spironolactone (ALDACTONE) 100 MG tablet Take 1 tablet by mouth Every Night.               PROGRESS, DATA ANALYSIS, CONSULTS, AND MEDICAL DECISION MAKING  All labs have been independently interpreted by me.  All radiology studies have been reviewed by me. All EKG's have been independently viewed and interpreted by me.  Discussion below represents my analysis of pertinent findings related to patient's condition, differential diagnosis, treatment plan and final disposition.    Patient presentation and evaluation most consistent with SHALOM, hydronephrosis related to recent ureteral stent placement.  Plan for admission and urology  consultation.    DIFFERENTIAL DIAGNOSIS INCLUDE BUT NOT LIMITED TO:     Differential diagnosis includes but is not limited to:  - Hepatobiliary pathology such as cholecystitis, cholangitis, and symptomatic cholelithiasis  - Pancreatitis  - Dyspepsia  - Small bowel obstruction  - Appendicitis  - Diverticulitis  - UTI including pyelonephritis  - Ureteral stone  - Zoster  - Colitis, including infectious and ischemic  - Atypical ACS    Clinical Scores: N/A      ED Course as of 12/09/24 0751   Mon Dec 09, 2024   0705 Creatinine(!): 1.51 [DC]   0706 WBC(!): 14.36  12.4 three days ago [WH]   0706 Creatinine(!): 1.51  1.09 three days ago [WH]   0706 Leukocytes, UA(!): Moderate (2+) []   0706 Nitrite, UA: Negative [WH]   0706 RBC, UA(!): Too Numerous to Count [WH]   0706 WBC, UA(!): 11-20 [WH]   0706 Bacteria, UA(!): Trace [WH]   0742 I discussed the case with MD Yuliana at this time regarding the patient.  I discussed work-up, results, concerns.  I discussed the consulting provider's desire for admission to the hospitalist service for urology consultation and further symptom control.   [DC]   0748 I discussed the case with MD Autumn with [facility] at this time regarding the patient.  I discussed work-up, results, concerns.  I discussed the consulting provider's desire for med surg admit.    [DC]      ED Course User Index  [DC] Dane Nguyen PA  [WH] Erickson Medina MD            0752 I rechecked the patient.  I discussed the patient's labs, radiology findings (including all incidental findings), diagnosis, and plan for admission. The patient understands and agrees with the plan.      AS OF 07:51 EST VITALS:    BP - 134/74  HR - 92  TEMP - 99.3 °F (37.4 °C) (Tympanic)  O2 SATS - 96%    COMPLEXITY OF CARE  The patient requires admission.      DIAGNOSIS  Final diagnoses:   SHALOM (acute kidney injury)   Hydronephrosis, unspecified hydronephrosis type   Abnormal CT scan         DISPOSITION  ED Disposition       ED  Disposition   Decision to Admit    Condition   --    Comment   Level of Care: Med/Surg [1]   Diagnosis: SHALOM (acute kidney injury) [193378]   Admitting Physician: VARGAS SONG [[id number]]   Attending Physician: VARGAS SONG [[id number]]   Certification: I Certify That Inpatient Hospital Services Are Medically Necessary For Greater Than 2 Midnights                  Please note that portions of this document were completed with a voice recognition program.    Note Disclaimer: At [facility], we believe that sharing information builds trust and better relationships. You are receiving this note because you recently visited [facility]. It is possible you will see health information before a provider has talked with you about it. This kind of information can be easy to misunderstand. To help you fully understand what it means for your health, we urge you to discuss this note with your provider.         Dane Nguyen PA  12/09/24 0753

## 2024-12-09 NOTE — CONSULTS
FIRST UROLOGY CONSULT      Patient Identification:  NAME:  Star Rollins  Age:  34 y.o.   Sex:  female   :  1990   MRN:  [id number]       Chief complaint: Chest pain    History of present illness: 34-year-old female who last Thursday underwent a robotic transperitoneal right pyeloplasty by Dr. Sher Rey.  She was discharged home the following day.  Been doing pretty well over the weekend and began having severe pain yesterday evening and fairly acute in the right flank.  Today she has more chest related pain and getting shortness of air when she takes deep inhalations.  She has been getting up moving around but not a whole lot.  She has had no gross hematuria no dysuria no irritable urinary symptoms      Past medical history:  Past Medical History:   Diagnosis Date    ADD (attention deficit disorder)     Anxiety     Generalized headaches     GERD (gastroesophageal reflux disease)     Hidradenitis     History of COVID-2022    Papilledema associated with increased intracranial pressure     PT STATES - INCREASED SPINAL FLUID PRESSING ON OPTIC NERVE - FOLLOWED BY OPTHOMOLOGIST    Seasonal allergies     UPJ obstruction, congenital        Past surgical history:  Past Surgical History:   Procedure Laterality Date    CHOLECYSTECTOMY N/A 2023    Procedure: CHOLECYSTECTOMY LAPAROSCOPIC WITH DAVINCI ROBOT with cholangiogram;  Surgeon: Boy Dsouza MD;  Location: [facility];  Service: Robotics - Actual Experiencei;  Laterality: N/A;    PYELOPLASTY Right 2024    Procedure: RIGHT ROBOTIC PYLEOPLASTY;  Surgeon: Po Bridges MD;  Location: [facility];  Service: Robotics - Actual Experiencei;  Laterality: Right;    TYMPANOSTOMY TUBE PLACEMENT      WISDOM TOOTH EXTRACTION         Allergies:  Patient has no known allergies.    Home medications:  Medications Prior to Admission   Medication Sig Dispense Refill Last Dose/Taking    acetaZOLAMIDE (DIAMOX) 250 MG tablet Take 1 tablet by mouth Daily.    12/8/2024    cephalexin (Keflex) 500 MG capsule Take 1 capsule by mouth 2 (Two) Times a Day. (Patient taking differently: Take 1 capsule by mouth 2 (Two) Times a Day. Ends 12/11/24) 10 capsule 0 12/8/2024    cetirizine (zyrTEC) 10 MG tablet Take 1 tablet by mouth Daily As Needed.   12/8/2024    HYDROcodone-acetaminophen (Norco) 5-325 MG per tablet Take 1 tablet by mouth Every 6 (Six) Hours As Needed for Moderate Pain. 20 tablet 0 12/9/2024 Morning    levonorgestrel (MIRENA) 20 MCG/24HR IUD 1 each by Intrauterine route 1 (One) Time.   Taking    pantoprazole (PROTONIX) 40 MG EC tablet Take 1 tablet by mouth Daily. 90 tablet 0 12/8/2024    spironolactone (ALDACTONE) 100 MG tablet Take 1 tablet by mouth Every Night.   12/8/2024    clindamycin (CLINDAGEL) 1 % gel Apply 1 Application topically to the appropriate area as directed Daily As Needed.   More than a month    fluticasone (FLONASE) 50 MCG/ACT nasal spray Administer 1 spray into the nostril(s) as directed by provider Daily As Needed.   More than a month       Hospital medications:  acetaZOLAMIDE, 250 mg, Oral, Daily  pantoprazole, 40 mg, Oral, Q AM  senna-docusate sodium, 2 tablet, Oral, BID  sodium chloride, 10 mL, Intravenous, Q12H  [Held by provider] spironolactone, 100 mg, Oral, Nightly      sodium chloride, 100 mL/hr, Last Rate: 100 mL/hr (12/09/24 0801)        acetaminophen    senna-docusate sodium **AND** polyethylene glycol **AND** bisacodyl **AND** bisacodyl    Calcium Replacement - Follow Nurse / BPA Driven Protocol    HYDROcodone-acetaminophen    HYDROmorphone    HYDROmorphone    Magnesium Standard Dose Replacement - Follow Nurse / BPA Driven Protocol    ondansetron ODT **OR** ondansetron    oxyCODONE-acetaminophen    Phosphorus Replacement - Follow Nurse / BPA Driven Protocol    Potassium Replacement - Follow Nurse / BPA Driven Protocol    [COMPLETED] Insert Peripheral IV **AND** sodium chloride    sodium chloride    sodium chloride    Family  history:  Family History   Problem Relation Age of Onset    Arthritis Mother     Asthma Mother     Gallbladder disease Mother     Diabetes Father     COPD Father     Hypertension Father     Alcohol abuse Father     Asthma Father     Depression Father     Early death Father     Mental illness Father     Gallbladder disease Father     Diabetes Maternal Uncle     Vision loss Maternal Uncle     Hypertension Maternal Uncle     Hypertension Maternal Uncle     Vision loss Maternal Uncle     Anxiety disorder Paternal Aunt     Depression Paternal Aunt     Diabetes Paternal Aunt     Hypertension Paternal Aunt     Mental illness Paternal Aunt     Developmental Disability Maternal Grandmother     COPD Maternal Grandmother     Cancer Maternal Grandmother     Diabetes Maternal Grandmother     Hypertension Maternal Grandmother     Developmental Disability Maternal Grandfather     Hypertension Maternal Grandfather     Diabetes Maternal Grandfather     Developmental Disability Paternal Grandmother     COPD Paternal Grandmother     Diabetes Paternal Grandmother     Hypertension Paternal Grandmother     Developmental Disability Paternal Grandfather     Malig Hyperthermia Neg Hx        Social history:  Social History     Tobacco Use    Smoking status: Never    Smokeless tobacco: Never   Vaping Use    Vaping status: Never Used   Substance Use Topics    Alcohol use: Not Currently    Drug use: No       Review of systems:    Negative 12-system ROS except for the following: As above.  No irritable urinary symptom      Objective:  TMax 24 hours:   Temp (24hrs), Av.1 °F (37.8 °C), Min:99.3 °F (37.4 °C), Max:100.8 °F (38.2 °C)      Vitals Ranges:   Temp:  [99.3 °F (37.4 °C)-100.8 °F (38.2 °C)] 100.8 °F (38.2 °C)  Heart Rate:  [] 123  Resp:  [18-20] 20  BP: (134-151)/(52-90) 151/90    Intake/Output Last 3 shifts:  No intake/output data recorded.     Physical Exam:       General Appearance:    Alert, cooperative, in no acute distress    Head:    Normocephalic, without obvious abnormality, atraumatic   Eyes:          PERRL, conjunctivae and corneas clear   Ears:    Normal external inspection   Throat:   No oral lesions, oral mucosa moist   Neck:   Supple, no LAD, trachea midline   Back:     No CVA tenderness   Lungs:     Respirations unlabored, symmetric excursion    Heart:    RRR, intact peripheral pulses   Abdomen:   Postsurgical no peritoneal sign   :       Extremities:   No edema, no deformity   Skin:   No bleeding, bruising or rashes   Neuro/Psych:   Orientation intact, mood/affect pleasant, no focal findings       Data review:  Lab Results (last 24 hours)       Procedure Component Value Units Date/Time    Sodium, Urine, Random - Urine, Clean Catch [[id number]] Collected: 12/09/24 1208    Specimen: Urine, Clean Catch Updated: 12/09/24 1223    Osmolality, Urine - Urine, Clean Catch [[id number]] Collected: 12/09/24 1208    Specimen: Urine, Clean Catch Updated: 12/09/24 1223    Creatinine Urine Random (kidney function) GFR component - Urine, Clean Catch [[id number]] Collected: 12/09/24 1208    Specimen: Urine, Clean Catch Updated: 12/09/24 1223    Eosinophil Smear - Urine, Urine, Clean Catch [[id number]] Collected: 12/09/24 1208    Specimen: Urine, Clean Catch Updated: 12/09/24 1223    TSH Rfx On Abnormal To Free T4 [[id number]]  (Normal) Collected: 12/09/24 0613    Specimen: Blood Updated: 12/09/24 1221     TSH 1.990 uIU/mL     Blood Culture - Blood, Arm, Left [[id number]] Collected: 12/09/24 0930    Specimen: Blood from Arm, Left Updated: 12/09/24 1006    Blood Culture - Blood, Arm, Left [[id number]] Collected: 12/09/24 0837    Specimen: Blood from Arm, Left Updated: 12/09/24 0853    Urinalysis, Microscopic Only - Urine, Clean Catch [[id number]]  (Abnormal) Collected: 12/09/24 0607    Specimen: Urine, Clean Catch Updated: 12/09/24 0655     RBC, UA Too Numerous to Count /HPF      WBC, UA 11-20 /HPF      Bacteria, UA Trace /HPF      Squamous  Epithelial Cells, UA 3-6 /HPF      Hyaline Casts, UA None Seen /LPF      Calcium Oxalate Crystals, UA Small/1+ /HPF      Methodology Automated Microscopy    Comprehensive Metabolic Panel [[id number]]  (Abnormal) Collected: 12/09/24 0613    Specimen: Blood Updated: 12/09/24 0643     Glucose 118 mg/dL      BUN 16 mg/dL      Creatinine 1.51 mg/dL      Sodium 132 mmol/L      Potassium 4.1 mmol/L      Chloride 98 mmol/L      CO2 22.3 mmol/L      Calcium 9.1 mg/dL      Total Protein 7.5 g/dL      Albumin 3.6 g/dL      ALT (SGPT) 83 U/L      AST (SGOT) 48 U/L      Alkaline Phosphatase 54 U/L      Total Bilirubin 1.0 mg/dL      Globulin 3.9 gm/dL      A/G Ratio 0.9 g/dL      BUN/Creatinine Ratio 10.6     Anion Gap 11.7 mmol/L      eGFR 46.6 mL/min/1.73     Narrative:      GFR Normal >60  Chronic Kidney Disease <60  Kidney Failure <15      Lipase [[id number]]  (Abnormal) Collected: 12/09/24 0613    Specimen: Blood Updated: 12/09/24 0643     Lipase 9 U/L     Urinalysis With Microscopic If Indicated (No Culture) - Urine, Clean Catch [[id number]]  (Abnormal) Collected: 12/09/24 0607    Specimen: Urine, Clean Catch Updated: 12/09/24 0641     Color, UA Dark Yellow     Appearance, UA Cloudy     pH, UA 5.5     Specific Gravity, UA 1.023     Glucose, UA Negative     Ketones, UA Trace     Bilirubin, UA Negative     Blood, UA Large (3+)     Protein,  mg/dL (2+)     Leuk Esterase, UA Moderate (2+)     Nitrite, UA Negative     Urobilinogen, UA 1.0 E.U./dL    CBC & Differential [[id number]]  (Abnormal) Collected: 12/09/24 0613    Specimen: Blood Updated: 12/09/24 0622    Narrative:      The following orders were created for panel order CBC & Differential.  Procedure                               Abnormality         Status                     ---------                               -----------         ------                     CBC Auto Differential[[id number]]        Abnormal            Final result                 Please view results  for these tests on the individual orders.    CBC Auto Differential [[id number]]  (Abnormal) Collected: 12/09/24 0613    Specimen: Blood Updated: 12/09/24 0622     WBC 14.36 10*3/mm3      RBC 4.49 10*6/mm3      Hemoglobin 13.5 g/dL      Hematocrit 40.7 %      MCV 90.6 fL      MCH 30.1 pg      MCHC 33.2 g/dL      RDW 11.7 %      RDW-SD 38.7 fl      MPV 9.5 fL      Platelets 318 10*3/mm3      Neutrophil % 84.6 %      Lymphocyte % 7.7 %      Monocyte % 6.7 %      Eosinophil % 0.2 %      Basophil % 0.3 %      Immature Grans % 0.5 %      Neutrophils, Absolute 12.14 10*3/mm3      Lymphocytes, Absolute 1.11 10*3/mm3      Monocytes, Absolute 0.96 10*3/mm3      Eosinophils, Absolute 0.03 10*3/mm3      Basophils, Absolute 0.05 10*3/mm3      Immature Grans, Absolute 0.07 10*3/mm3      nRBC 0.0 /100 WBC              Imaging:  Imaging Results (Last 24 Hours)       Procedure Component Value Units Date/Time    CT Abdomen Pelvis Stone Protocol [[id number]] Collected: 12/09/24 0710     Updated: 12/09/24 0710    Narrative:      CT ABDOMEN AND PELVIS WITHOUT IV CONTRAST     HISTORY: 34-year-old female status post right robotic pyeloplasty on  12/05/2024. Right flank pain. Cholecystectomy in the past.     TECHNIQUE: Radiation dose reduction techniques were utilized, including  automated exposure control and exposure modulation based on body size.   3 mm images were obtained through the abdomen and pelvis without the  administration of IV contrast. No prior CT for comparison.     FINDINGS:  1. The right renal pelvis is markedly dilated with right ureteral stent  in place. Right renal pelvis is distended with fluid measuring 5.3 cm in  transverse diameter. Probable iatrogenic bubble of air at the right  renal pelvis and within the urinary bladder. There is moderate right  perinephric and periureteral stranding and there is a tiny sliver of  perihepatic fluid as well. There is a very small amount of free fluid  within the pelvis which is  within physiological range and may be related  to rupture of an ovarian cyst or follicle.  2. No acute abnormality is seen at the noncontrasted spleen, pancreas,  adrenals, or left kidney. There is no acute bowel abnormality. Appendix  is within normal limits. IUD is present centrally within the uterus.  Adnexa appear unremarkable.  3. No pleural effusions or convincing evidence for pneumonia at the  visualized lung bases.                   Results Review  I reviewed the patient's prior history and old records to the best of my ability.   I have personally reviewed all pertinent imaging myself and their accompanying reports.   I have reviewed all labs.        Assessment:       SHALOM (acute kidney injury)    Abdominal pain    Hydronephrosis of right kidney    Flank pain possible chest pain component rule out pulmonary embolus.  Imaging has been ordered by the hospitalist group.  Concerning findings on her CAT scan other than some dilation.    Plan:     No stent change at this time.  Check her PVR.  Await CT angiogram chest  Jani Rivero MD  12/09/24  12:42 EST

## 2024-12-09 NOTE — ED NOTES
Nursing report ED to floor  Star Rollins  34 y.o.  female    HPI :  HPI  Stated Reason for Visit: pt had pyeloplasty on 12/5 and has increased pain since midnight. has taken home pain medication without relief. Patient has been able to urinate  History Obtained From: patient    Chief Complaint  Chief Complaint   Patient presents with    Post-op Problem       Admitting doctor:   Jaret Leyva MD    Admitting diagnosis:   The primary encounter diagnosis was SHALOM (acute kidney injury). Diagnoses of Hydronephrosis, unspecified hydronephrosis type and Abnormal CT scan were also pertinent to this visit.    Code status:   Current Code Status       Date Active Code Status Order ID Comments User Context       Not on file            Allergies:   Patient has no known allergies.    Isolation:   No active isolations    Intake and Output  No intake or output data in the 24 hours ending 12/09/24 0813    Weight:       12/09/24  0541   Weight: 119 kg (262 lb)       Most recent vitals:   Vitals:    12/09/24 0624 12/09/24 0706 12/09/24 0757 12/09/24 0801   BP: 134/74  148/84 144/52   Pulse: 89 92 105 104   Resp:       Temp:       TempSrc:       SpO2: 97% 96% 95% 97%   Weight:       Height:           Active LDAs/IV Access:   Lines, Drains & Airways       Active LDAs       Name Placement date Placement time Site Days    Peripheral IV 12/09/24 0612 Right Antecubital 12/09/24 0612  Antecubital  less than 1                    Labs (abnormal labs have a star):   Labs Reviewed   COMPREHENSIVE METABOLIC PANEL - Abnormal; Notable for the following components:       Result Value    Glucose 118 (*)     Creatinine 1.51 (*)     Sodium 132 (*)     ALT (SGPT) 83 (*)     AST (SGOT) 48 (*)     eGFR 46.6 (*)     All other components within normal limits    Narrative:     GFR Normal >60  Chronic Kidney Disease <60  Kidney Failure <15     URINALYSIS W/ MICROSCOPIC IF INDICATED (NO CULTURE) - Abnormal; Notable for the following components:     Color, UA Dark Yellow (*)     Appearance, UA Cloudy (*)     Ketones, UA Trace (*)     Blood, UA Large (3+) (*)     Protein,  mg/dL (2+) (*)     Leuk Esterase, UA Moderate (2+) (*)     All other components within normal limits   LIPASE - Abnormal; Notable for the following components:    Lipase 9 (*)     All other components within normal limits   CBC WITH AUTO DIFFERENTIAL - Abnormal; Notable for the following components:    WBC 14.36 (*)     RDW 11.7 (*)     Neutrophil % 84.6 (*)     Lymphocyte % 7.7 (*)     Eosinophil % 0.2 (*)     Neutrophils, Absolute 12.14 (*)     Monocytes, Absolute 0.96 (*)     Immature Grans, Absolute 0.07 (*)     All other components within normal limits   URINALYSIS, MICROSCOPIC ONLY - Abnormal; Notable for the following components:    RBC, UA Too Numerous to Count (*)     WBC, UA 11-20 (*)     Bacteria, UA Trace (*)     Squamous Epithelial Cells, UA 3-6 (*)     All other components within normal limits   BLOOD CULTURE   BLOOD CULTURE   CBC AND DIFFERENTIAL    Narrative:     The following orders were created for panel order CBC & Differential.  Procedure                               Abnormality         Status                     ---------                               -----------         ------                     CBC Auto Differential[[id number]]        Abnormal            Final result                 Please view results for these tests on the individual orders.       EKG:   No orders to display       Meds given in ED:   Medications   sodium chloride 0.9 % flush 10 mL (has no administration in time range)   cefTRIAXone (ROCEPHIN) 2,000 mg in sodium chloride 0.9 % 100 mL MBP (has no administration in time range)   acetaZOLAMIDE (DIAMOX) tablet 250 mg (has no administration in time range)   HYDROcodone-acetaminophen (NORCO) 5-325 MG per tablet 1 tablet (has no administration in time range)   pantoprazole (PROTONIX) EC tablet 40 mg (has no administration in time range)   spironolactone  (ALDACTONE) tablet 100 mg ( Oral Dose Auto Held 12/17/24 2100)   sodium chloride 0.9 % flush 10 mL (has no administration in time range)   sodium chloride 0.9 % flush 10 mL (has no administration in time range)   sodium chloride 0.9 % infusion 40 mL (has no administration in time range)   Potassium Replacement - Follow Nurse / BPA Driven Protocol (has no administration in time range)   Magnesium Standard Dose Replacement - Follow Nurse / BPA Driven Protocol (has no administration in time range)   Phosphorus Replacement - Follow Nurse / BPA Driven Protocol (has no administration in time range)   Calcium Replacement - Follow Nurse / BPA Driven Protocol (has no administration in time range)   sodium chloride 0.9 % infusion (100 mL/hr Intravenous New Bag 12/9/24 0801)   sennosides-docusate (PERICOLACE) 8.6-50 MG per tablet 2 tablet (has no administration in time range)     And   polyethylene glycol (MIRALAX) packet 17 g (has no administration in time range)     And   bisacodyl (DULCOLAX) EC tablet 5 mg (has no administration in time range)     And   bisacodyl (DULCOLAX) suppository 10 mg (has no administration in time range)   ondansetron ODT (ZOFRAN-ODT) disintegrating tablet 4 mg (has no administration in time range)     Or   ondansetron (ZOFRAN) injection 4 mg (has no administration in time range)   acetaminophen (TYLENOL) tablet 650 mg (has no administration in time range)   fentaNYL citrate (PF) (SUBLIMAZE) injection 50 mcg (50 mcg Intravenous Given 12/9/24 0620)   ondansetron (ZOFRAN) injection 4 mg (4 mg Intravenous Given 12/9/24 0619)   morphine injection 4 mg (4 mg Intravenous Given 12/9/24 2037)       Imaging results:  No radiology results for the last day    Ambulatory status:   -    Social issues:   Social History     Socioeconomic History    Marital status: Single   Tobacco Use    Smoking status: Never    Smokeless tobacco: Never   Vaping Use    Vaping status: Never Used   Substance and Sexual Activity     Alcohol use: Not Currently    Drug use: No    Sexual activity: Not Currently     Partners: Male     Birth control/protection: I.U.D.       Peripheral Neurovascular       Neuro Cognitive       Learning       Respiratory       Abdominal Pain       Pain Assessments  Pain (Adult)  (0-10) Pain Rating: Rest: 9  (0-10) Pain Rating: Activity: 9  Pain Location: abdomen  Pain Side/Orientation: right    NIH Stroke Scale       Sean Rocha RN  12/09/24 08:13 EST

## 2024-12-09 NOTE — PROGRESS NOTES
Clinical Pharmacy Services: Medication History    Star Rollins is a 34 y.o. female presenting to [facility] for   Chief Complaint   Patient presents with    Post-op Problem       She  has a past medical history of ADD (attention deficit disorder), Anxiety, Generalized headaches, GERD (gastroesophageal reflux disease), Hidradenitis, History of COVID-19 (2022), Papilledema associated with increased intracranial pressure, Seasonal allergies, and UPJ obstruction, congenital.    Allergies as of 12/09/2024    (No Known Allergies)       Medication information was obtained from: Patient   Pharmacy and Phone Number:     Prior to Admission Medications       Prescriptions Last Dose Informant Patient Reported? Taking?    acetaZOLAMIDE (DIAMOX) 250 MG tablet 12/8/2024 Pharmacy, Self Yes Yes    Take 1 tablet by mouth Daily.    cephalexin (Keflex) 500 MG capsule 12/8/2024 Pharmacy No Yes    Take 1 capsule by mouth 2 (Two) Times a Day.    Patient taking differently:  Take 1 capsule by mouth 2 (Two) Times a Day. Ends 12/11/24    cetirizine (zyrTEC) 10 MG tablet 12/8/2024 Pharmacy, Self Yes Yes    Take 1 tablet by mouth Daily As Needed.    clindamycin (CLINDAGEL) 1 % gel More than a month Pharmacy, Self Yes No    Apply 1 Application topically to the appropriate area as directed Daily As Needed.    fluticasone (FLONASE) 50 MCG/ACT nasal spray More than a month Self Yes No    Administer 1 spray into the nostril(s) as directed by provider Daily As Needed.    HYDROcodone-acetaminophen (Norco) 5-325 MG per tablet 12/9/2024 Pharmacy, Self No Yes    Take 1 tablet by mouth Every 6 (Six) Hours As Needed for Moderate Pain.    levonorgestrel (MIRENA) 20 MCG/24HR IUD   Yes Yes    1 each by Intrauterine route 1 (One) Time.    pantoprazole (PROTONIX) 40 MG EC tablet 12/8/2024 Pharmacy No Yes    Take 1 tablet by mouth Daily.    spironolactone (ALDACTONE) 100 MG tablet 12/8/2024 Pharmacy Yes Yes    Take 1 tablet by mouth Every  Night.              Medication notes:     This medication list is complete to the best of my knowledge as of 12/9/2024    Please call if questions.    Tylor Bonner  Medication History Technician  [phone]    12/9/2024 08:34 EST

## 2024-12-09 NOTE — H&P
Name: Star Rollins ADMIT: 2024   : 1990  PCP: Lucie Chung APRN    MRN: [id number] LOS: 1 days   AGE/SEX: 34 y.o. female  ROOM:      Chief Complaint   Patient presents with    Post-op Problem       Subjective   Patient is a 34 y.o. female who presents to [facility] with the above chief complaint.  She has a history of reflux hidradenitis papilledema secondary to idiopathic intracranial hypertension.  She is also hide history with ureteral reflux.  She was recently seen and evaluated by urology in the outpatient setting and underwent a pyeloplasty on Thursday.  Procedure itself was tolerated well and was discharged home on oral Keflex.  She was taking her normal medications and did well until last night.  She started develop pain on the right side of the evening around midnight and was progressively worse.  She denies any fevers or chills no nausea or vomiting.  The pain is mainly in her right lower quadrant and shoots up to her shoulder and back.  She says that her urination has somewhat backed off over the last 24 hours as she has not been urinating as well.  She denies any dysuria.  The pain seems to be worse if she takes deep breath.    History of Present Illness    Past Medical History:   Diagnosis Date    ADD (attention deficit disorder)     Anxiety     Generalized headaches     GERD (gastroesophageal reflux disease)     Hidradenitis     History of COVID-2022    Papilledema associated with increased intracranial pressure     PT STATES - INCREASED SPINAL FLUID PRESSING ON OPTIC NERVE - FOLLOWED BY OPTHOMOLOGIST    Seasonal allergies     UPJ obstruction, congenital      Past Surgical History:   Procedure Laterality Date    CHOLECYSTECTOMY N/A 2023    Procedure: CHOLECYSTECTOMY LAPAROSCOPIC WITH DAVINCI ROBOT with cholangiogram;  Surgeon: Boy Dsouza MD;  Location: [facility];  Service: Robotics - DaVinci;  Laterality: N/A;    PYELOPLASTY Right 2024     Procedure: RIGHT ROBOTIC PYLEOPLASTY;  Surgeon: Po Bridges MD;  Location: [facility];  Service: Robotics - DaVinci;  Laterality: Right;    TYMPANOSTOMY TUBE PLACEMENT      WISDOM TOOTH EXTRACTION       Family History   Problem Relation Age of Onset    Arthritis Mother     Asthma Mother     Gallbladder disease Mother     Diabetes Father     COPD Father     Hypertension Father     Alcohol abuse Father     Asthma Father     Depression Father     Early death Father     Mental illness Father     Gallbladder disease Father     Diabetes Maternal Uncle     Vision loss Maternal Uncle     Hypertension Maternal Uncle     Hypertension Maternal Uncle     Vision loss Maternal Uncle     Anxiety disorder Paternal Aunt     Depression Paternal Aunt     Diabetes Paternal Aunt     Hypertension Paternal Aunt     Mental illness Paternal Aunt     Developmental Disability Maternal Grandmother     COPD Maternal Grandmother     Cancer Maternal Grandmother     Diabetes Maternal Grandmother     Hypertension Maternal Grandmother     Developmental Disability Maternal Grandfather     Hypertension Maternal Grandfather     Diabetes Maternal Grandfather     Developmental Disability Paternal Grandmother     COPD Paternal Grandmother     Diabetes Paternal Grandmother     Hypertension Paternal Grandmother     Developmental Disability Paternal Grandfather     Malig Hyperthermia Neg Hx      Social History     Tobacco Use    Smoking status: Never    Smokeless tobacco: Never   Vaping Use    Vaping status: Never Used   Substance Use Topics    Alcohol use: Not Currently    Drug use: No     (Not in a hospital admission)    Allergies:  Patient has no known allergies.    Review of Systems     Objective    Vital Signs  Temp:  [99.3 °F (37.4 °C)] 99.3 °F (37.4 °C)  Heart Rate:  [] 92  Resp:  [18] 18  BP: (134-136)/(74-84) 134/74  SpO2:  [96 %-98 %] 96 %  on   ;   Device (Oxygen Therapy): room air  Body mass index is 47.92  "kg/m².    Physical Exam  Vitals reviewed.   Constitutional:       General: She is not in acute distress.     Appearance: She is obese. She is ill-appearing.   Cardiovascular:      Rate and Rhythm: Regular rhythm. Tachycardia present.   Pulmonary:      Effort: Pulmonary effort is normal. No respiratory distress.      Breath sounds: No wheezing.   Abdominal:      General: Bowel sounds are normal. There is no distension.      Palpations: Abdomen is soft.      Tenderness: There is abdominal tenderness.   Musculoskeletal:         General: No swelling. Normal range of motion.   Neurological:      General: No focal deficit present.      Mental Status: She is alert and oriented to person, place, and time. Mental status is at baseline.         Results Review:   I reviewed the patient's new clinical results.  Results from last 7 days   Lab Units 12/09/24  0613 12/06/24  0359   WBC 10*3/mm3 14.36* 12.43*   HEMOGLOBIN g/dL 13.5 13.8   PLATELETS 10*3/mm3 318 358     Results from last 7 days   Lab Units 12/09/24  0613 12/06/24  0359   SODIUM mmol/L 132* 132*   POTASSIUM mmol/L 4.1 4.4   CHLORIDE mmol/L 98 101   CO2 mmol/L 22.3 21.0*   BUN mg/dL 16 18   CREATININE mg/dL 1.51* 1.09*   GLUCOSE mg/dL 118* 143*   ALBUMIN g/dL 3.6  --    BILIRUBIN mg/dL 1.0  --    ALK PHOS U/L 54  --    AST (SGOT) U/L 48*  --    ALT (SGPT) U/L 83*  --    Estimated Creatinine Clearance: 65 mL/min (A) (by C-G formula based on SCr of 1.51 mg/dL (H)).        Invalid input(s): \"LDLCALC\"  Results from last 7 days   Lab Units 12/09/24  0607   NITRITE UA  Negative   WBC UA /HPF 11-20*   BACTERIA UA /HPF Trace*   SQUAM EPITHEL UA /HPF 3-6*     CT Abdomen Pelvis Stone Protocol           Assessment & Plan       SHALOM (acute kidney injury)    Abdominal pain    Hydronephrosis of right kidney      Assessment & Plan    -Baseline creatinine is around 0.9-1.  Creatinine today is 1.5.  Based on her GFR she likely has mild chronic kidney disease stage II and worsening " creatinine consistent with acute renal failure.  -Given the potential obstruction fractional secretion of sodium is not all that beneficial to determine the etiology of her renal failure.  Urology's been consulted to evaluate the hydronephrosis seen on CT scan  -She does have potential pyelonephritis with inflammatory changes seen surrounding the ureter and kidney but it is difficult to say how much of this is related to the recent procedure.  At any rate she can placed on Rocephin 2 g IV blood cultures and urine cultures been obtained.  -Will trend her renal function closely if renal function does not improve with IV fluids will need nephrology input.  -She has sinus tachycardia seen on her EKG.  There is a pleuritic component to her pain we will check a CTA of the chest given her recent impaired mobility and procedure and obesity she is at risk for blood clots but this does seem less likely.  -I think the sinus tachycardia is probably just a symptom of her pain and discomfort.  Will follow-up how she responds to pain medication.  I have ordered Dilaudid 0.5 mg every 2 hours as needed on top of her oral pain medication.  -Plan for mechanical DVT prophylaxis for now while concern for possible need for additional procedures  -Full code    I discussed the patients findings and my recommendations with patient, family, and nursing staff.    Jaret Leyva MD  [facility]  12/09/24  07:53 EST

## 2024-12-09 NOTE — PLAN OF CARE
Goal Outcome Evaluation:  Plan of Care Reviewed With: patient        Progress: no change  Outcome Evaluation: admit with R abd pain and c/o R upper abd under ribs/chest pain with deep inhalation. + sepsis, IV fluids and IV abx per orders. CTA done; IV Dilaudid for pain with some relief. Norco changed to Percocet for better pain control. O2 2L due to sats decreasing with dilaudid. no soa or resp distress. voiding small amts but bladder scan for pvr=0. pt reports bloody urine at home, but urine orange, clear here. urine specimens sent this AM. Pt up to br with min asst. enc ambulation and cdb. HR tachy, Temp max 101.2.

## 2024-12-09 NOTE — ED PROVIDER NOTES
MD ATTESTATION NOTE     SHARED VISIT: This visit was performed by BOTH a physician and an APC. The substantive portion of the medical decision making was performed by this attesting physician who made or approved the management plan and takes responsibility for patient management. All studies in the APC note (if performed) were independently interpreted by me.  The DRAKE and I have discussed this patient's history, physical exam, and treatment plan. I have reviewed the documentation and personally had a face to face interaction with the patient. I affirm the documentation and agree with the treatment and plan. I provided a substantive portion of the care of the patient.  I personally performed the physical exam in its entirety, and below are my findings.      Brief HPI: Patient complains of acute right abdominal pain since last night.  She had a right pyeloplasty on 12/5.  Her pain has been well-controlled on hydrocodone until last night.  She denies fever, chest pain, shortness of breath, vomiting, diarrhea, constipation, or dysuria.    PHYSICAL EXAM    GENERAL: Awake, alert, oriented x 3.  Nontoxic-appearing female.  She appears uncomfortable.  HENT: nares patent  EYES: no scleral icterus  CV: regular rhythm, normal rate  RESPIRATORY: normal effort, CTAB  ABDOMEN: soft, obese, there is right sided abdominal tenderness without rebound or guarding  MUSCULOSKELETAL: no deformity  NEURO: alert, moves all extremities, follows commands  PSYCH:  calm, cooperative  SKIN: warm, dry, incisions are intact and without signs of infection    Vital signs and nursing notes reviewed.        Plan: Patient complains of acute postoperative abdominal pain.  She is afebrile.  White blood cell count and creatinine are elevated.  CT scan is pending.  She has been given Zofran and fentanyl for pain.    Differential diagnosis includes but is not limited to: Renal colic, postoperative complication, UTI, bowel obstruction    CT abdomen/pelvis  personally interpreted by me.  My personal interpretation is: There is dilation of the right renal pelvis.  Right ureteral stent is in place.  No bowel obstruction    MDM: Patient presented to the ED complaining of acute postoperative abdominal pain.  She did not have an acute abdomen.  She was afebrile.  Workup showed mild acute kidney injury.  CT scan showed significant dilation of the right renal pelvis with some surrounding stranding.  Urinalysis was concerning for infection.  Patient was given IV antibiotics.  Case was discussed with urology and the hospitalist.  Patient will be admitted.    ED Course as of 12/09/24 0823   Mon Dec 09, 2024   0705 Creatinine(!): 1.51 [DC]   0706 WBC(!): 14.36  12.4 three days ago [WH]   0706 Creatinine(!): 1.51  1.09 three days ago [WH]   0706 Leukocytes, UA(!): Moderate (2+) []   0706 Nitrite, UA: Negative []   0706 RBC, UA(!): Too Numerous to Count []   0706 WBC, UA(!): 11-20 []   0706 Bacteria, UA(!): Trace [WH]   0742 I discussed the case with MD Yuliana at this time regarding the patient.  I discussed work-up, results, concerns.  I discussed the consulting provider's desire for admission to the hospitalist service for urology consultation and further symptom control.   [DC]   0748 I discussed the case with MD Autumn with [facility] at this time regarding the patient.  I discussed work-up, results, concerns.  I discussed the consulting provider's desire for med surg admit.    [DC]      ED Course User Index  [DC] Dane Nguyen PA  [] Erickson Medina MD Holland, William D, MD  12/09/24 0823

## 2024-12-09 NOTE — Clinical Note
Level of Care: Med/Surg [1]   Diagnosis: SHALOM (acute kidney injury) [447668]   Admitting Physician: VARGAS SONG [[id number]]   Attending Physician: VARGAS SONG [[id number]]   Certification: I Certify That Inpatient Hospital Services Are Medically Necessary For Greater Than 2 Midnights

## 2024-12-10 LAB
ANION GAP SERPL CALCULATED.3IONS-SCNC: 7.7 MMOL/L (ref 5–15)
BUN SERPL-MCNC: 13 MG/DL (ref 6–20)
BUN/CREAT SERPL: 11.8 (ref 7–25)
CALCIUM SPEC-SCNC: 9 MG/DL (ref 8.6–10.5)
CHLORIDE SERPL-SCNC: 100 MMOL/L (ref 98–107)
CO2 SERPL-SCNC: 26.3 MMOL/L (ref 22–29)
CREAT SERPL-MCNC: 1.1 MG/DL (ref 0.57–1)
DEPRECATED RDW RBC AUTO: 39.2 FL (ref 37–54)
EGFRCR SERPLBLD CKD-EPI 2021: 68.2 ML/MIN/1.73
ERYTHROCYTE [DISTWIDTH] IN BLOOD BY AUTOMATED COUNT: 11.7 % (ref 12.3–15.4)
GLUCOSE SERPL-MCNC: 111 MG/DL (ref 65–99)
HCT VFR BLD AUTO: 36.6 % (ref 34–46.6)
HGB BLD-MCNC: 12.3 G/DL (ref 12–15.9)
MAGNESIUM SERPL-MCNC: 2.3 MG/DL (ref 1.6–2.6)
MCH RBC QN AUTO: 31.1 PG (ref 26.6–33)
MCHC RBC AUTO-ENTMCNC: 33.6 G/DL (ref 31.5–35.7)
MCV RBC AUTO: 92.7 FL (ref 79–97)
PHOSPHATE SERPL-MCNC: 3 MG/DL (ref 2.5–4.5)
PLATELET # BLD AUTO: 311 10*3/MM3 (ref 140–450)
PMV BLD AUTO: 9.9 FL (ref 6–12)
POTASSIUM SERPL-SCNC: 5 MMOL/L (ref 3.5–5.2)
RBC # BLD AUTO: 3.95 10*6/MM3 (ref 3.77–5.28)
SODIUM SERPL-SCNC: 134 MMOL/L (ref 136–145)
WBC NRBC COR # BLD AUTO: 11.79 10*3/MM3 (ref 3.4–10.8)

## 2024-12-10 PROCEDURE — 85027 COMPLETE CBC AUTOMATED: CPT | Performed by: HOSPITALIST

## 2024-12-10 PROCEDURE — G0378 HOSPITAL OBSERVATION PER HR: HCPCS

## 2024-12-10 PROCEDURE — 25010000002 CEFTRIAXONE PER 250 MG: Performed by: HOSPITALIST

## 2024-12-10 PROCEDURE — 80048 BASIC METABOLIC PNL TOTAL CA: CPT | Performed by: HOSPITALIST

## 2024-12-10 PROCEDURE — 84100 ASSAY OF PHOSPHORUS: CPT | Performed by: HOSPITALIST

## 2024-12-10 PROCEDURE — 83735 ASSAY OF MAGNESIUM: CPT | Performed by: HOSPITALIST

## 2024-12-10 RX ADMIN — ACETAMINOPHEN 325MG 650 MG: 325 TABLET ORAL at 09:41

## 2024-12-10 RX ADMIN — CEFTRIAXONE 2000 MG: 2 INJECTION, POWDER, FOR SOLUTION INTRAMUSCULAR; INTRAVENOUS at 06:14

## 2024-12-10 RX ADMIN — PANTOPRAZOLE SODIUM 40 MG: 40 TABLET, DELAYED RELEASE ORAL at 06:14

## 2024-12-10 RX ADMIN — SENNOSIDES AND DOCUSATE SODIUM 2 TABLET: 50; 8.6 TABLET ORAL at 09:29

## 2024-12-10 RX ADMIN — Medication 10 ML: at 09:30

## 2024-12-10 RX ADMIN — Medication 10 ML: at 21:02

## 2024-12-10 RX ADMIN — OXYCODONE HYDROCHLORIDE AND ACETAMINOPHEN 1 TABLET: 7.5; 325 TABLET ORAL at 01:51

## 2024-12-10 RX ADMIN — ACETAMINOPHEN 325MG 650 MG: 325 TABLET ORAL at 13:55

## 2024-12-10 RX ADMIN — ACETAZOLAMIDE 250 MG: 250 TABLET ORAL at 09:29

## 2024-12-10 RX ADMIN — POLYETHYLENE GLYCOL 3350 17 G: 17 POWDER, FOR SOLUTION ORAL at 21:01

## 2024-12-10 RX ADMIN — ACETAMINOPHEN 325MG 650 MG: 325 TABLET ORAL at 18:36

## 2024-12-10 NOTE — PLAN OF CARE
Goal Outcome Evaluation:  Plan of Care Reviewed With: patient        Progress: improving  Outcome Evaluation: pt up walking around the unit two times, cough improving, lap sites are clean, dry and intact, IV abt given as ordered, voiding yellow urine, diet advanced to regular as tolerated. no n/v.

## 2024-12-10 NOTE — PROGRESS NOTES
Dedicated to [facility]    [phone]   LOS: 1 day     Name: Star Rollins  Age/Sex: 34 y.o. female  :  1990        PCP: Lucie Chung APRN  Chief Complaint   Patient presents with    Post-op Problem      Subjective   She is feeling better today her pain is under much better control.  Denies new issues or complaints at this time.  She does feel like she is urinating much better  General: No Fever or Chills, Cardiac: No Chest Pain or Palpitations, Resp: No Cough or SOA, GI: No Nausea, Vomiting, or Diarrhea, and Other: No bleeding    acetaZOLAMIDE, 250 mg, Oral, Daily  cefTRIAXone, 2,000 mg, Intravenous, Daily  pantoprazole, 40 mg, Oral, Q AM  senna-docusate sodium, 2 tablet, Oral, BID  sodium chloride, 10 mL, Intravenous, Q12H  [Held by provider] spironolactone, 100 mg, Oral, Nightly           Objective   Vital Signs  Temp:  [97.1 °F (36.2 °C)-98.4 °F (36.9 °C)] 98 °F (36.7 °C)  Heart Rate:  [] 100  Resp:  [16-18] 18  BP: (122-142)/(66-90) 126/73  Body mass index is 48.95 kg/m².    Intake/Output Summary (Last 24 hours) at 12/10/2024 1421  Last data filed at 12/10/2024 1344  Gross per 24 hour   Intake 480 ml   Output 1750 ml   Net -1270 ml       Physical Exam  Vitals and nursing note reviewed.   Constitutional:       General: She is not in acute distress.     Appearance: She is obese. She is ill-appearing.   Cardiovascular:      Rate and Rhythm: Normal rate and regular rhythm.      Heart sounds: Normal heart sounds. No murmur heard.  Pulmonary:      Effort: No respiratory distress.      Breath sounds: Normal breath sounds.   Neurological:      Mental Status: She is alert and oriented to person, place, and time. Mental status is at baseline.           Results Review:       I reviewed the patient's new clinical results.  Results from last 7 days   Lab Units 12/10/24  0454 24  0613 24  0359   WBC 10*3/mm3 11.79* 14.36* 12.43*   HEMOGLOBIN g/dL 12.3 13.5 13.8   PLATELETS 10*3/mm3 311  318 358     Results from last 7 days   Lab Units 12/10/24  0454 12/09/24  0613 12/06/24  0359   SODIUM mmol/L 134* 132* 132*   POTASSIUM mmol/L 5.0 4.1 4.4   CHLORIDE mmol/L 100 98 101   CO2 mmol/L 26.3 22.3 21.0*   BUN mg/dL 13 16 18   CREATININE mg/dL 1.10* 1.51* 1.09*   CALCIUM mg/dL 9.0 9.1 8.8   MAGNESIUM mg/dL 2.3  --   --    PHOSPHORUS mg/dL 3.0  --   --    Estimated Creatinine Clearance: 90.1 mL/min (A) (by C-G formula based on SCr of 1.1 mg/dL (H)).      Assessment & Plan   Active Hospital Problems    Diagnosis  POA    **SHALOM (acute kidney injury) [N17.9]  Yes    Abdominal pain [R10.9]  Unknown    Hydronephrosis of right kidney [N13.30]  Unknown      Resolved Hospital Problems   No resolved problems to display.       PLAN  This is a 34-year-old lady with a history of ureteral reflux, hidradenitis, papilledema secondary to idiopathic intracranial hypertension and other medical comorbidities who presents to the hospital with abdominal pain and is found no acute renal failure  -Renal function is improving.  Can stop IV fluids for now.  Continue to monitor urine output closely.  -Continue to hold spironolactone for now blood pressure remains acceptable  -Imaging reviewed with the patient at the bedside.  Discussed her atelectasis and need for pulmonary hygiene.  -Urology evaluated the patient at the same position and no need for additional intervention from her perspective  -Unfortunately the emergency room did not add a urine culture to her urinalysis.  This was added this morning.  -Remains on empiric Rocephin and was on Keflex in the outpatient setting  -I think if she continues to show improvement and remains afebrile with normal white count and improved renal function she can probably discharge home tomorrow      Disposition  Expected Discharge Date: 12/11/2024; Expected Discharge Time:        Jaret Leyva MD  [facility]  12/10/24  14:21 EST

## 2024-12-10 NOTE — PLAN OF CARE
Goal Outcome Evaluation:  Plan of Care Reviewed With: patient           Outcome Evaluation: VSS, voiding, no BM reported, percocet given for pain x2, IV abx given, IVF at KVO, walked unit, friend at bedside, 4 healing lap sites and 1 healing site from JOHN drain.urine a dark orange, will continue to monitor.

## 2024-12-10 NOTE — PROGRESS NOTES
LOS: 1 day   Patient Care Team:  Lucie Chung APRN as PCP - General (Family Medicine)  Esau Streeter DO as Emergency Attending (Family Medicine)    Chief Complaint: Flank pain    Subjective     Interval History:   Patient's pain much improved.  She looks very good.  Voiding without difficulty.    Review of Systems:    All systems were reviewed and negative except for:  Genitourinary: postivie for  pain    Objective     Vital Signs  Temp:  [97.1 °F (36.2 °C)-101.2 °F (38.4 °C)] 97.4 °F (36.3 °C)  Heart Rate:  [] 99  Resp:  [16-18] 18  BP: (122-142)/(66-91) 128/76    Physical Exam:   No exam performed today,     Results Review:     I reviewed the patient's new clinical results.    Medication Review:     Assessment & Plan       SHALOM (acute kidney injury)    Abdominal pain    Hydronephrosis of right kidney      Acute kidney injury  - resolved.  Hydronephrosis status post right pyeloplasty.  Patient had a negative CT of the chest no evidence of pulmonary embolus.  Pain and swelling likely due to pyelonephritis which we are awaiting final urine culture.  Patient overall looks much better and plan for likely discharge tomorrow.    Plan for disposition:    Po Bridges MD  12/10/24  11:59 EST      Time:

## 2024-12-11 ENCOUNTER — READMISSION MANAGEMENT (OUTPATIENT)
Dept: CALL CENTER | Facility: HOSPITAL | Age: 34
End: 2024-12-11
Payer: COMMERCIAL

## 2024-12-11 VITALS
SYSTOLIC BLOOD PRESSURE: 114 MMHG | HEART RATE: 77 BPM | HEIGHT: 62 IN | WEIGHT: 267.64 LBS | TEMPERATURE: 97 F | DIASTOLIC BLOOD PRESSURE: 72 MMHG | OXYGEN SATURATION: 95 % | RESPIRATION RATE: 18 BRPM | BODY MASS INDEX: 49.25 KG/M2

## 2024-12-11 PROBLEM — N10 ACUTE PYELONEPHRITIS: Status: ACTIVE | Noted: 2024-12-11

## 2024-12-11 LAB
ALBUMIN SERPL-MCNC: 3.1 G/DL (ref 3.5–5.2)
ANION GAP SERPL CALCULATED.3IONS-SCNC: 8.7 MMOL/L (ref 5–15)
BACTERIA SPEC AEROBE CULT: NO GROWTH
BASOPHILS # BLD AUTO: 0.03 10*3/MM3 (ref 0–0.2)
BASOPHILS NFR BLD AUTO: 0.5 % (ref 0–1.5)
BUN SERPL-MCNC: 10 MG/DL (ref 6–20)
BUN/CREAT SERPL: 13.5 (ref 7–25)
CALCIUM SPEC-SCNC: 8.9 MG/DL (ref 8.6–10.5)
CHLORIDE SERPL-SCNC: 103 MMOL/L (ref 98–107)
CO2 SERPL-SCNC: 25.3 MMOL/L (ref 22–29)
CREAT SERPL-MCNC: 0.74 MG/DL (ref 0.57–1)
DEPRECATED RDW RBC AUTO: 38.4 FL (ref 37–54)
EGFRCR SERPLBLD CKD-EPI 2021: 109.7 ML/MIN/1.73
EOSINOPHIL # BLD AUTO: 0.18 10*3/MM3 (ref 0–0.4)
EOSINOPHIL NFR BLD AUTO: 2.7 % (ref 0.3–6.2)
ERYTHROCYTE [DISTWIDTH] IN BLOOD BY AUTOMATED COUNT: 11.7 % (ref 12.3–15.4)
GLUCOSE SERPL-MCNC: 93 MG/DL (ref 65–99)
HCT VFR BLD AUTO: 34.1 % (ref 34–46.6)
HGB BLD-MCNC: 11.6 G/DL (ref 12–15.9)
IMM GRANULOCYTES # BLD AUTO: 0.02 10*3/MM3 (ref 0–0.05)
IMM GRANULOCYTES NFR BLD AUTO: 0.3 % (ref 0–0.5)
LYMPHOCYTES # BLD AUTO: 1.33 10*3/MM3 (ref 0.7–3.1)
LYMPHOCYTES NFR BLD AUTO: 20.3 % (ref 19.6–45.3)
MAGNESIUM SERPL-MCNC: 2.4 MG/DL (ref 1.6–2.6)
MCH RBC QN AUTO: 30.7 PG (ref 26.6–33)
MCHC RBC AUTO-ENTMCNC: 34 G/DL (ref 31.5–35.7)
MCV RBC AUTO: 90.2 FL (ref 79–97)
MONOCYTES # BLD AUTO: 0.6 10*3/MM3 (ref 0.1–0.9)
MONOCYTES NFR BLD AUTO: 9.2 % (ref 5–12)
NEUTROPHILS NFR BLD AUTO: 4.39 10*3/MM3 (ref 1.7–7)
NEUTROPHILS NFR BLD AUTO: 67 % (ref 42.7–76)
NRBC BLD AUTO-RTO: 0 /100 WBC (ref 0–0.2)
PHOSPHATE SERPL-MCNC: 2.8 MG/DL (ref 2.5–4.5)
PLATELET # BLD AUTO: 309 10*3/MM3 (ref 140–450)
PMV BLD AUTO: 9.6 FL (ref 6–12)
POTASSIUM SERPL-SCNC: 4 MMOL/L (ref 3.5–5.2)
RBC # BLD AUTO: 3.78 10*6/MM3 (ref 3.77–5.28)
SODIUM SERPL-SCNC: 137 MMOL/L (ref 136–145)
WBC NRBC COR # BLD AUTO: 6.55 10*3/MM3 (ref 3.4–10.8)

## 2024-12-11 PROCEDURE — 85025 COMPLETE CBC W/AUTO DIFF WBC: CPT | Performed by: HOSPITALIST

## 2024-12-11 PROCEDURE — 25010000002 CEFTRIAXONE PER 250 MG: Performed by: HOSPITALIST

## 2024-12-11 PROCEDURE — 80069 RENAL FUNCTION PANEL: CPT | Performed by: HOSPITALIST

## 2024-12-11 PROCEDURE — G0378 HOSPITAL OBSERVATION PER HR: HCPCS

## 2024-12-11 PROCEDURE — 83735 ASSAY OF MAGNESIUM: CPT | Performed by: HOSPITALIST

## 2024-12-11 PROCEDURE — 96366 THER/PROPH/DIAG IV INF ADDON: CPT

## 2024-12-11 RX ORDER — LEVOFLOXACIN 500 MG/1
500 TABLET, FILM COATED ORAL EVERY 24 HOURS
Status: DISCONTINUED | OUTPATIENT
Start: 2024-12-11 | End: 2024-12-11 | Stop reason: HOSPADM

## 2024-12-11 RX ORDER — LEVOFLOXACIN 500 MG/1
500 TABLET, FILM COATED ORAL EVERY 24 HOURS
Qty: 5 TABLET | Refills: 0 | Status: SHIPPED | OUTPATIENT
Start: 2024-12-11 | End: 2024-12-17

## 2024-12-11 RX ADMIN — ACETAMINOPHEN 325MG 650 MG: 325 TABLET ORAL at 10:53

## 2024-12-11 RX ADMIN — PANTOPRAZOLE SODIUM 40 MG: 40 TABLET, DELAYED RELEASE ORAL at 06:35

## 2024-12-11 RX ADMIN — CEFTRIAXONE 2000 MG: 2 INJECTION, POWDER, FOR SOLUTION INTRAMUSCULAR; INTRAVENOUS at 06:35

## 2024-12-11 RX ADMIN — ACETAZOLAMIDE 250 MG: 250 TABLET ORAL at 08:40

## 2024-12-11 RX ADMIN — ACETAMINOPHEN 325MG 650 MG: 325 TABLET ORAL at 06:35

## 2024-12-11 RX ADMIN — ACETAMINOPHEN 325MG 650 MG: 325 TABLET ORAL at 00:14

## 2024-12-11 RX ADMIN — Medication 10 ML: at 08:41

## 2024-12-11 NOTE — DISCHARGE SUMMARY
Patient Name: Star Rollins  : 1990  MRN: [id number]    Date of Admission: 2024  Date of Discharge:  2024  Primary Care Physician: Lucie Chung APRN      Chief Complaint:   Post-op Problem      Discharge Diagnoses     Active Hospital Problems    Diagnosis  POA    **SHALOM (acute kidney injury) [N17.9]  Yes    Acute pyelonephritis [N10]  Unknown    Abdominal pain [R10.9]  Unknown    Hydronephrosis of right kidney [N13.30]  Unknown      Resolved Hospital Problems   No resolved problems to display.        Hospital Course     Ms. Rollins is a 34 y.o. female with a history of recent pyeloplasty, chronic kidney disease who presented to [facility] initially complaining of abdominal pain.  Please see the admitting history and physical for further details.  She was found to have hydronephrosis and ultimately acute pyelonephritis and was admitted to the hospital for further evaluation and treatment.  She admitted to the hospital acute renal failure and hydronephrosis.  There was some concern for pyelonephritis and ultimately she developed sepsis as the day went on.  She was started on antibiotics on admission and was continued on these throughout the hospitalization her white blood cell count normalized and cultures remain negative.  She was on Keflex in the outpatient setting and was transition to oral Levaquin and discharged home today.  She has follow-up with urology on Monday      Day of Discharge     Subjective:  She is feeling much better she is only been using Tylenol for pain she is tolerating a regular diet.  Her heart rate is improved and she is overall feeling much better.  She wants to go home    Physical Exam:  Temp:  [96.9 °F (36.1 °C)-99.3 °F (37.4 °C)] 96.9 °F (36.1 °C)  Heart Rate:  [] 77  Resp:  [18] 18  BP: (106-137)/(59-87) 114/72  Body mass index is 48.95 kg/m².  Physical Exam  Vitals and nursing note reviewed.   Cardiovascular:      Rate and Rhythm: Normal  rate and regular rhythm.   Pulmonary:      Effort: No respiratory distress.      Breath sounds: Normal breath sounds.   Neurological:      Mental Status: She is alert and oriented to person, place, and time. Mental status is at baseline.         Consultants     Consult Orders (all) (From admission, onward)       Start     Ordered    12/09/24 0752  Inpatient Urology Consult  Once        Specialty:  Urology  Provider:  Po Bridges MD    12/09/24 0753    12/09/24 0729  [facility] (on-call MD unless specified) Details  Once,   Status:  Canceled        Specialty:  Hospitalist  Provider:  Jaret Leyva MD    12/09/24 0728    12/09/24 0713  Urology (on-call MD unless specified)  Once        Specialty:  Urology  Provider:  Po Bridges MD    12/09/24 0712                  Procedures     * Surgery not found *    Imaging Results (All)       Procedure Component Value Units Date/Time    CT Angiogram Chest [[id number]] Collected: 12/09/24 1454     Updated: 12/09/24 1507    Narrative:      CT ANGIOGRAM CHEST-     DATE OF EXAM: 12/9/2024 1:47 PM     INDICATION: chest pain tachycardia. Painful inspiration. Rule out PE.     COMPARISON: CT abdomen pelvis 12/9/2024. CT chest 10/26/2020.     TECHNIQUE: Multiple contiguous axial images were acquired through the  chest following the intravenous administration of 95 mL of Isovue-370.  Reformatted coronal, sagittal, and 3D reconstruction images were also  reviewed. Radiation dose reduction techniques were utilized, including  automated exposure control and exposure modulation based on body size.     FINDINGS:  No evidence of a pulmonary arterial filling defect to suggest pulmonary  embolism. The heart and great vessels of the chest are normal in size.  No evidence of calcified coronary artery disease. Trace pericardial  fluid. No pathologically enlarged intrathoracic lymph nodes are  identified.     Low lung volumes with bandlike regions of perihilar and basilar  volume  loss and consolidation in each lower lobe, likely atelectasis with  possible superimposed pneumonia. Subsegmental atelectasis in the base of  the lingula. No concerning pulmonary nodule. The central airways are  widely patent. No pneumothorax or pleural effusion.     Limited imaging of the upper abdomen partially includes small volume  ascites.     No acute osseous abnormality or concerning osseous lesion       Impression:         1. The study is negative for pulmonary embolism.  2. Low lung volumes with bandlike regions of perihilar and basilar  volume loss and consolidation in each lower lobe, likely atelectasis  with possible superimposed pneumonia.  3. Partially imaged small volume ascites in the abdomen.     This report was finalized on 12/9/2024 3:04 PM by Paul Newell MD on  Workstation: [id number]       CT Abdomen Pelvis Stone Protocol [[id number]] Collected: 12/09/24 0710     Updated: 12/09/24 1432    Narrative:      CT ABDOMEN AND PELVIS WITHOUT IV CONTRAST     HISTORY: 34-year-old female status post right robotic pyeloplasty on  12/05/2024. Right flank pain. Cholecystectomy in the past.     TECHNIQUE: Radiation dose reduction techniques were utilized, including  automated exposure control and exposure modulation based on body size.   3 mm images were obtained through the abdomen and pelvis without the  administration of IV contrast. No prior CT for comparison.     FINDINGS:  1. The right renal pelvis is markedly dilated with right ureteral stent  in place. Right renal pelvis is distended with fluid measuring 5.3 cm in  transverse diameter. Probable iatrogenic bubble of air at the right  renal pelvis and within the urinary bladder. There is moderate right  perinephric and periureteral stranding and there is a tiny sliver of  perihepatic fluid as well. There is a very small amount of free fluid  within the pelvis which is within physiological range and may be related  to rupture of an ovarian cyst  "or follicle.     2. No acute abnormality is seen at the noncontrasted spleen, pancreas,  adrenals, or left kidney. There is no acute bowel abnormality. Appendix  is within normal limits. IUD is present centrally within the uterus.  Adnexa appear unremarkable.     3. No pleural effusions or convincing evidence for pneumonia at the  visualized lung bases.        This report was finalized on 12/9/2024 2:29 PM by Dr. Maris Ventura M.D on  Workstation: [id number]                 Pertinent Labs     Results from last 7 days   Lab Units 12/11/24  0557 12/10/24  0454 12/09/24  0613 12/06/24  0359   WBC 10*3/mm3 6.55 11.79* 14.36* 12.43*   HEMOGLOBIN g/dL 11.6* 12.3 13.5 13.8   PLATELETS 10*3/mm3 309 311 318 358     Results from last 7 days   Lab Units 12/11/24  0557 12/10/24  0454 12/09/24  0613 12/06/24  0359   SODIUM mmol/L 137 134* 132* 132*   POTASSIUM mmol/L 4.0 5.0 4.1 4.4   CHLORIDE mmol/L 103 100 98 101   CO2 mmol/L 25.3 26.3 22.3 21.0*   BUN mg/dL 10 13 16 18   CREATININE mg/dL 0.74 1.10* 1.51* 1.09*   GLUCOSE mg/dL 93 111* 118* 143*   EGFR mL/min/1.73 109.7 68.2 46.6* 68.9     Results from last 7 days   Lab Units 12/11/24 0557 12/09/24 0613   ALBUMIN g/dL 3.1* 3.6   BILIRUBIN mg/dL  --  1.0   ALK PHOS U/L  --  54   AST (SGOT) U/L  --  48*   ALT (SGPT) U/L  --  83*     Results from last 7 days   Lab Units 12/11/24  0557 12/10/24  0454 12/09/24  0613 12/06/24  0359   CALCIUM mg/dL 8.9 9.0 9.1 8.8   ALBUMIN g/dL 3.1*  --  3.6  --    MAGNESIUM mg/dL 2.4 2.3  --   --    PHOSPHORUS mg/dL 2.8 3.0  --   --      Results from last 7 days   Lab Units 12/09/24  0613   LIPASE U/L 9*       Results from last 7 days   Lab Units 12/09/24  1208   SODIUM UR mmol/L 138   CREATININE UR mg/dL 73.1   OSMOLALITY UR mOsm/kg 510   EOSINOPHIL SMEAR % EOS/100 Cells 0         Invalid input(s): \"LDLCALC\"  Results from last 7 days   Lab Units 12/09/24  0930 12/09/24  0837   BLOODCX  No growth at 24 hours No growth at 2 days         Test " Results Pending at Discharge     Pending Results       Procedure [Order ID] Specimen - Date/Time    Urine Culture - Urine, Urine, Clean Catch [[id number]] Collected: 12/09/24 0607    Specimen: Urine, Clean Catch Updated: 12/10/24 1022              Discharge Details        Discharge Medications        New Medications        Instructions Start Date   levoFLOXacin 500 MG tablet  Commonly known as: LEVAQUIN   500 mg, Oral, Every 24 Hours             Continue These Medications        Instructions Start Date   acetaZOLAMIDE 250 MG tablet  Commonly known as: DIAMOX   250 mg, Oral, Daily      cetirizine 10 MG tablet  Commonly known as: zyrTEC   10 mg, Oral, Daily PRN      clindamycin 1 % gel   Apply 1 Application topically to the appropriate area as directed Daily As Needed.      fluticasone 50 MCG/ACT nasal spray  Commonly known as: FLONASE   Administer 1 spray into the nostril(s) as directed by provider Daily As Needed.      HYDROcodone-acetaminophen 5-325 MG per tablet  Commonly known as: Norco   1 tablet, Oral, Every 6 Hours PRN      levonorgestrel 20 MCG/24HR IUD  Commonly known as: MIRENA   1 each by Intrauterine route 1 (One) Time.      pantoprazole 40 MG EC tablet  Commonly known as: PROTONIX   40 mg, Oral, Daily      spironolactone 100 MG tablet  Commonly known as: ALDACTONE   100 mg, Nightly             Stop These Medications      cephalexin 500 MG capsule  Commonly known as: Keflex              No Known Allergies    Discharge Disposition:  Home or Self Care      Discharge Diet:  Diet Order   Procedures    Diet: Regular/House; Fluid Consistency: Thin (IDDSI 0)       Discharge Activity:   Activity Instructions       Activity as Tolerated              CODE STATUS:    Code Status and Medical Interventions: CPR (Attempt to Resuscitate); Full Support   Ordered at: 12/09/24 6319     Level Of Support Discussed With:    Patient     Code Status (Patient has no pulse and is not breathing):    CPR (Attempt to Resuscitate)      Medical Interventions (Patient has pulse or is breathing):    Full Support       Future Appointments   Date Time Provider Department Center   12/17/2024  3:30 PM Lucie Chung APRN [facility]     Additional Instructions for the Follow-ups that You Need to Schedule       Discharge Follow-up with PCP   As directed       Currently Documented PCP:    Lucie Chung APRN    PCP Phone Number:    [phone]     Follow Up Details: 2-3 weeks        Discharge Follow-up with Specified Provider: Urology; 1 Week   As directed      To: Urology   Follow Up: 1 Week               Follow-up Information       Lucie Chung APRN .    Specialty: Family Medicine  Why: 2-3 weeks  Contact information:  [address]  [place] [address]  [phone]                             Additional Instructions for the Follow-ups that You Need to Schedule       Discharge Follow-up with PCP   As directed       Currently Documented PCP:    Lucie Chung APRN    PCP Phone Number:    [phone]     Follow Up Details: 2-3 weeks        Discharge Follow-up with Specified Provider: Urology; 1 Week   As directed      To: Urology   Follow Up: 1 Week            Time Spent on Discharge:  Greater than 30 minutes      Jaret Leyva MD  [facility]  12/11/24  09:49 EST

## 2024-12-11 NOTE — OUTREACH NOTE
Prep Survey      Flowsheet Row Responses   [facility] patient discharged from? [place]   Is LACE score < 7 ? Yes   Eligibility [facility]   Date of Admission 12/09/24   Date of Discharge 12/11/24   Discharge Disposition Home or Self Care   Discharge diagnosis SHALOM   Does the patient have one of the following disease processes/diagnoses(primary or secondary)? Other   Does the patient have Home health ordered? No   Is there a DME ordered? No   Prep survey completed? Yes            SEBASTIÁN JOHN - Registered Nurse

## 2024-12-11 NOTE — CASE MANAGEMENT/SOCIAL WORK
CLINICAL NUTRITION SERVICES - ASSESSMENT NOTE     Nutrition Prescription    RECOMMENDATIONS FOR MDs/PROVIDERS TO ORDER:  Encourage oral intake     Malnutrition Status:  Severe malnutrition in the context of chronic illness    Recommendations already ordered by Registered Dietitian (RD):  Carolyne Avina chocolate and vanilla for pt to try    Future/Additional Recommendations:  1. Monitor oral intake, supplement use, and GI symptoms    2. If enteral nutrition is in line with plan/goals of care:   -Osmolite 1.5 Ady @ goal of  45ml/hr  (1080ml/day) + 2 packets prosource will provide: 1700 kcals (~30 kcal/kg), 89 g PRO (1.5 g/kg),  822 ml free H20, 219 g CHO, and 0 g fiber daily.  --Start at 10 mL/hr and advance by 10 mL q 8 hours to goal if K>/=3, Mg>/=1.5, and Po4>/=1.9. If lytes not within parameters, hold TF at current rate and replace  --15 mL certavite daily to help meet micronutient needs   --30 mL water flush q 4 hours for tube patency     3. If TPN aligns with goals:   Dosing weight: 57 kg   Access: Central Line   Begin with minimal volume (900mL) or max concentration per phamD with initial dextrose of 105 g (GIR= 1.27 mg/kg/min), 85g AA (1.5 g/kg) and 250 mL 20% intralipid 5 per week = 1054 kcal (18kcal/kg)  -Once patient receives 100% of initial PN volume with K>/=3, Mg>/=1.5, and Po4>/=2, advance dextrose by 50-55 g every 1-2 days to goal of 265g (GIR=3.2  mg/kg/min)  --TPN at goal concentration provides: 265g dex (901kcal), 85g AA (1.5 g/kg) and 250 mL 20% intralipid 5x per week = 1598 kcal and 22kcal from fat   --Electrolytes/Additives per pharmD, recommend infuvite daily and MTE-4  --Monitor bili, LFTs, TG at the start and weekly thereafter while on TPN  --Monitor for onset of hyperglycemia and need for addition of insulin per pharmD/MD -- likely will need insulin on board with hx of DM vs consideration of endocrinology consult if indicated     REASON FOR ASSESSMENT  Mary Henderson is a/an 76 year old female  Case Management Discharge Note      Final Note: DC'd home. Cordell HOLBROOK RN         Selected Continued Care - Discharged on 12/11/2024 Admission date: 12/9/2024 - Discharge disposition: Home or Self Care      Destination    No services have been selected for the patient.                Durable Medical Equipment    No services have been selected for the patient.                Dialysis/Infusion    No services have been selected for the patient.                Home Medical Care    No services have been selected for the patient.                Therapy    No services have been selected for the patient.                Community Resources    No services have been selected for the patient.                Community & DME    No services have been selected for the patient.                    Transportation Services  Private: Car    Final Discharge Disposition Code: 01 - home or self-care   "assessed by the dietitian for Admission Nutrition Risk Screen for positive (losing 24-33 pound weight loss and decreased appetite) and Provider Order - 25+ lb unintended weight loss; poor appetite, metastic carcynoid tumor to the liver, chronic diarrhea and failure to thrive. Recurrent paracentesis and hyponatremia.      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    Patient and  in room to provide information. She  follows a low-lactose and low-fiber diet- does take lactase when she eats dairy products. Mary reported she has an appetite and foods sound good but when she eats, she is not able to eat much due to early satiety 2/2 ascites and food not tasting right.     She has increased paracentesis frequency and thought today she was getting \"filled up with fluid\" ascites greatly impacts appetite. Paracentesis has increased in frequency lately.    She uses lozenges (biotene)  to help with dry mouth think they might be causing taste changes. She also endorsed mouth pain like a burning sensation but denied leisons. No nausea/vomiting.     Also having diarrhea, reported banana tends to help, can usually eat half. She has had one stool documented yesterday. Denied signs of steatorrhea. She also reported liking toast. She does not care for electrolyte beverages with the exception of 4 flavors.  attempts to rotate them.     CURRENT NUTRITION ORDERS  Diet: Regular  Intake/Tolerance: No intake percentage documented  Breakfast ordered cereal w/ milk, apple juice, chavez, toast, and hot cocoa.   Lunch of pot roast and mashed potatoes.  Dinner: Grilled cheese and chicken noodle soup     LABS  - Potassium 4.6 " "(WNL) 12/3  - Sodium 127 (L) 12/3    MEDICATIONS  - Protonix  - Vit D3  - Octreotide  - Lomotil (PRN)   - Imodium (PRN)   - Zofran (PRN)     ANTHROPOMETRICS  Height: 157.5 cm (5' 2\")  Most Recent Weight: 57.1 kg (125 lb 12.8 oz)    IBW: 50 kg  BMI: Normal BMI  Weight History:  12/03/21  57.2 kg (126 lb 3.2 oz)   12/02/21  57.1 kg (125 lb 12.8 oz)     11/30/21 59.8 kg (131 lb 12.8 oz)   11/22/21 62.1 kg (136 lb 12.8 oz)   11/16/21 69.4 kg (153 lb)   10/27/21 71.7 kg (158 lb)     08/31/21 69.9 kg (154 lb)     06/01/21 70.3 kg (155 lb)   -- 7.9% weight loss in 1 week (severe),  --20.2% weight loss in 1 month (severe),   ~18.2% weight loss in 3 months (severe)  -- 18.6% weight loss in 6 months (severe)    Dosing Weight: 57 kg (actual)    ASSESSED NUTRITION NEEDS  RMR (MSJ) 1018  Estimated Energy Needs: 0714-2237+ kcals/day (25 - 30 kcals/kg)  Justification: Maintenance  Estimated Protein Needs: 68 - 86 grams protein/day (1.2 - 1.5 grams of pro/kg)  Justification: Increased needs  Estimated Fluid Needs: 8378-5044 mL/day (1 mL/kcal)  Justification: Maintenance or Per provider pending fluid status    PHYSICAL FINDINGS  Ascites  Rash on arm/deya and leg     MALNUTRITION  % Intake: < 75% for >/= 3 months (moderate)  % Weight Loss: > 2% in 1 week (severe)  Subcutaneous Fat Loss: Upper arm:  moderate  Muscle Loss: Temporal:  moderate, Scapular bone:  moderate, Thoracic region (clavicle, acromium bone, deltoid, trapezius, pectoral):  moderate and Dorsal hand:  moderate  Fluid Accumulation/Edema: Mild  Malnutrition Diagnosis: Severe malnutrition in the context of chronic illness    NUTRITION DIAGNOSIS  Inadequate oral intake related to early satiety 2/2 ascites and taste changes as evidenced by ~7.9% weight loss in 1 week and pt report of decreased appetite and taste changes.    INTERVENTIONS  Implementation  Nutrition Education:   Discussed trial of alternate hard candies: jolly ranchers, lemon drops, wearthers instead of " Biotene to help with taste changes.   Discussed BRAT diet for managing diarrhea.  Handouts: coping with diarrhea, coping with taste changes, and 6 small meals per day handouts with pt and .     Collaboration with other providers: Discusses options for diarrheal treatment for patient 2/2 carcinoid tumor such as PERT therapy.     Medical food supplement therapy: [employer] chocolate and vanilla for pt to try    Goals  Patient to consume % of nutritionally adequate meal trays TID, or the equivalent with supplements/snacks.     Monitoring/Evaluation  Progress toward goals will be monitored and evaluated per protocol.    Libby Wilson  Dietetic Intern    Cosign: Ann Marie Padgett RD, LD  5C/BMT pager: [phone]

## 2024-12-11 NOTE — PLAN OF CARE
Goal Outcome Evaluation:  Plan of Care Reviewed With: patient           Outcome Evaluation: VSS, tylenol given for minor pain, IV abx given Lap sites CDI, voiding freely, yellow urine, BMx2 this shift, tolerating diet, no nausea, will continue to monitor.

## 2024-12-12 ENCOUNTER — TRANSITIONAL CARE MANAGEMENT TELEPHONE ENCOUNTER (OUTPATIENT)
Dept: CALL CENTER | Facility: HOSPITAL | Age: 34
End: 2024-12-12
Payer: COMMERCIAL

## 2024-12-12 NOTE — OUTREACH NOTE
Call Center TCM Note      Flowsheet Row Responses   [facility] patient discharged from? [place]   Does the patient have one of the following disease processes/diagnoses(primary or secondary)? Other   TCM attempt successful? No   Unsuccessful attempts Attempt 1  [Sasha mother on verbal release-no answer]            Risa Beck RN    12/12/2024, 13:24 EST

## 2024-12-12 NOTE — OUTREACH NOTE
Call Center TCM Note      Flowsheet Row Responses   [facility] [place] patient discharged from? [place]   Does the patient have one of the following disease processes/diagnoses(primary or secondary)? Other   TCM attempt successful? Yes   Call start time 1340   Call end time 1341   Discharge diagnosis SHALOM   Is patient permission given to speak with other caregiver? Yes   List who call center can speak with Sasha mother   Person spoke with today (if not patient) and relationship Sasha mother   Meds reviewed with patient/caregiver? Yes   Is the patient having any side effects they believe may be caused by any medication additions or changes? No   Does the patient have all medications ordered at discharge? Yes   Is the patient taking all medications as directed (includes completed medication regime)? Yes   Comments Pt has an office visit scheduled for 12/17/24,  this apt was scheduled prior to call   Does the patient have an appointment with their PCP within 7-14 days of discharge? Other   Nursing Interventions Routed TCM call to PCP office, PCP office requested to make appointment - message sent   Has home health visited the patient within 72 hours of discharge? N/A   Did the patient receive a copy of their discharge instructions? Yes   Nursing interventions Reviewed instructions with patient   What is the patient's perception of their health status since discharge? Improving   Is the patient/caregiver able to teach back signs and symptoms related to disease process for when to call PCP? Yes   Is the patient/caregiver able to teach back signs and symptoms related to disease process for when to call 911? Yes   Is the patient/caregiver able to teach back the hierarchy of who to call/visit for symptoms/problems? PCP, Specialist, Home health nurse, Urgent Care, ED, 911 Yes   If the patient is a current smoker, are they able to teach back resources for cessation? Not a smoker   TCM call completed? Yes   Call end time 1341             Risa Beck RN    12/12/2024, 13:43 EST

## 2024-12-14 LAB
BACTERIA SPEC AEROBE CULT: NORMAL
BACTERIA SPEC AEROBE CULT: NORMAL

## 2024-12-17 ENCOUNTER — OFFICE VISIT (OUTPATIENT)
Dept: FAMILY MEDICINE CLINIC | Facility: CLINIC | Age: 34
End: 2024-12-17
Payer: COMMERCIAL

## 2024-12-17 VITALS
HEIGHT: 62 IN | BODY MASS INDEX: 48.51 KG/M2 | HEART RATE: 104 BPM | OXYGEN SATURATION: 97 % | DIASTOLIC BLOOD PRESSURE: 84 MMHG | WEIGHT: 263.6 LBS | SYSTOLIC BLOOD PRESSURE: 126 MMHG

## 2024-12-17 DIAGNOSIS — Z09 HOSPITAL DISCHARGE FOLLOW-UP: Primary | ICD-10-CM

## 2024-12-17 PROCEDURE — 99395 PREV VISIT EST AGE 18-39: CPT | Performed by: NURSE PRACTITIONER

## 2024-12-17 PROCEDURE — 1160F RVW MEDS BY RX/DR IN RCRD: CPT | Performed by: NURSE PRACTITIONER

## 2024-12-17 PROCEDURE — 1126F AMNT PAIN NOTED NONE PRSNT: CPT | Performed by: NURSE PRACTITIONER

## 2024-12-17 PROCEDURE — 1159F MED LIST DOCD IN RCRD: CPT | Performed by: NURSE PRACTITIONER

## 2024-12-17 NOTE — PROGRESS NOTES
Subjective   Star Rollins is a 34 y.o. female. Presents today for   Chief Complaint   Patient presents with    Annual Exam    Hospital Follow Up Visit     Kidney Surgery  and then returned with Infection       History Of Present Illness Star Rollins is a 34-year-old female presenting today for a 6-month follow-up she was in the ER on 12/ 5 for pyelonephritis and on 12 /9 for an acute kidney injury    History of Present Illness  The patient presents for evaluation of post-surgical status following pyeloplasty.    She underwent a pyeloplasty on 12/05/2024 due to a ureteral obstruction, necessitating the placement of a stent. Subsequently, she was readmitted to the hospital for 3 days due to a severe urinary tract infection (UTI) that also affected her kidney. She was treated with intravenous antibiotics during her hospital stay and was discharged with a course of oral antibiotics, which she completed yesterday. A follow-up consultation with her urologist revealed significant improvement in her condition. An ultrasound is scheduled for tomorrow to assess the resolution of renal swelling. She had blood work done while she was in the hospital.    She has expressed interest in initiating weight loss injections but wishes to ensure her recovery from the recent health issues first. She has confirmed coverage for five different types of weight loss injections through [employer] and plans to schedule an appointment to start one of them once her health stabilizes.    Patient Active Problem List   Diagnosis    Anxiety and depression    Hidradenitis suppurativa    Obesity    Pseudotumor cerebri    General medical exam    Calculus of gallbladder without cholecystitis without obstruction    Ureteropelvic junction (UPJ) obstruction, right    Abdominal pain    Hydronephrosis of right kidney    SHALOM (acute kidney injury)    Acute pyelonephritis       Social History     Socioeconomic History    Marital status: Single   Tobacco Use  "   Smoking status: Never    Smokeless tobacco: Never   Vaping Use    Vaping status: Never Used   Substance and Sexual Activity    Alcohol use: Not Currently    Drug use: No    Sexual activity: Not Currently     Partners: Male     Birth control/protection: I.U.D.       No Known Allergies    Current Outpatient Medications on File Prior to Visit   Medication Sig Dispense Refill    acetaZOLAMIDE (DIAMOX) 250 MG tablet Take 1 tablet by mouth Daily.      cetirizine (zyrTEC) 10 MG tablet Take 1 tablet by mouth Daily As Needed.      clindamycin (CLINDAGEL) 1 % gel Apply 1 Application topically to the appropriate area as directed Daily As Needed.      fluticasone (FLONASE) 50 MCG/ACT nasal spray Administer 1 spray into the nostril(s) as directed by provider Daily As Needed.      levonorgestrel (MIRENA) 20 MCG/24HR IUD 1 each by Intrauterine route 1 (One) Time.      pantoprazole (PROTONIX) 40 MG EC tablet Take 1 tablet by mouth Daily. 90 tablet 0    spironolactone (ALDACTONE) 100 MG tablet Take 1 tablet by mouth Every Night.      [DISCONTINUED] HYDROcodone-acetaminophen (Norco) 5-325 MG per tablet Take 1 tablet by mouth Every 6 (Six) Hours As Needed for Moderate Pain. (Patient not taking: Reported on 12/17/2024) 20 tablet 0    [DISCONTINUED] levoFLOXacin (LEVAQUIN) 500 MG tablet Take 1 tablet by mouth Daily for 5 doses. Indications: Urinary Tract Infection 5 tablet 0     No current facility-administered medications on file prior to visit.       Objective   Vitals:    12/17/24 1535   BP: 126/84   Pulse: 104   SpO2: 97%   Weight: 120 kg (263 lb 9.6 oz)   Height: 157.5 cm (62\")     Body mass index is 48.21 kg/m².    Physical Exam    Physical Exam  Constitutional:       Appearance: Normal appearance.   HENT:      Head: Normocephalic and atraumatic.      Nose: Nose normal.      Mouth/Throat:      Mouth: Mucous membranes are moist.   Cardiovascular:      Rate and Rhythm: Normal rate and regular rhythm.      Pulses: Normal pulses. "      Heart sounds: Normal heart sounds.   Pulmonary:      Effort: Pulmonary effort is normal.      Breath sounds: Normal breath sounds.   Abdominal:      General: Bowel sounds are normal.   Musculoskeletal:         General: Normal range of motion.      Cervical back: Normal range of motion.   Skin:     General: Skin is warm and dry.      Capillary Refill: Capillary refill takes less than 2 seconds.   Neurological:      General: No focal deficit present.      Mental Status: She is alert.   Psychiatric:         Mood and Affect: Mood normal.         Results      Lab Results   Component Value Date    WBC 6.55 12/11/2024    HGB 11.6 (L) 12/11/2024    HCT 34.1 12/11/2024    MCV 90.2 12/11/2024     12/11/2024     Lab Results   Component Value Date    GLUCOSE 93 12/11/2024    BUN 10 12/11/2024    CREATININE 0.74 12/11/2024     12/11/2024    K 4.0 12/11/2024     12/11/2024    CALCIUM 8.9 12/11/2024    PROTEINTOT 7.5 12/09/2024    ALBUMIN 3.1 (L) 12/11/2024    ALT 83 (H) 12/09/2024    AST 48 (H) 12/09/2024    ALKPHOS 54 12/09/2024    BILITOT 1.0 12/09/2024    GLOB 3.9 12/09/2024    AGRATIO 0.9 12/09/2024    BCR 13.5 12/11/2024    ANIONGAP 8.7 12/11/2024    EGFR 109.7 12/11/2024     Lab Results   Component Value Date    CHLPL 173 06/24/2024    TRIG 124 06/24/2024    HDL 38 (L) 06/24/2024     (H) 06/24/2024          Procedures     Assessment & Plan   There are no diagnoses linked to this encounter.     Assessment & Plan  1. Post-surgical status following pyeloplasty.  She had kidney surgery on 12/05/2024 due to a blockage in her ureter and currently has a stent in place. She experienced a severe infection post-surgery, which required hospitalization for 3 days and treatment with IV antibiotics. She completed her course of oral antibiotics yesterday. She had blood work done while she was in the hospital. An ultrasound is scheduled for tomorrow to ensure the swelling in her kidney has subsided.    2.  Weight management.  She expressed interest in starting weight loss injections once her current health issues are resolved. She has confirmed that her insurance will cover 5 different types of weight loss injections. She will schedule an appointment to begin this treatment when she feels ready.    PROCEDURE  The patient underwent a pyeloplasty on 12/05/2024 due to a ureteral obstruction, necessitating the placement of a stent.                   No follow-ups on file.     Patient or patient representative verbalized consent for the use of Ambient Listening during the visit with  NICKY Parrish for chart documentation. 12/17/2024  17:01 EST     Answers submitted by the patient for this visit:  Primary Reason for Visit (Submitted on 12/15/2024)  What is the primary reason for your visit?: Problem Not Listed  Problem not listed (Submitted on 12/15/2024)  Chief Complaint: Other medical problem  Reason for appointment: 6 month follow up  Medications tried: N/a

## 2025-01-10 DIAGNOSIS — R10.13 EPIGASTRIC ABDOMINAL PAIN: ICD-10-CM

## 2025-01-10 RX ORDER — PANTOPRAZOLE SODIUM 40 MG/1
40 TABLET, DELAYED RELEASE ORAL DAILY
Qty: 90 TABLET | Refills: 0 | Status: SHIPPED | OUTPATIENT
Start: 2025-01-10

## 2025-03-14 ENCOUNTER — RESULTS FOLLOW-UP (OUTPATIENT)
Dept: FAMILY MEDICINE CLINIC | Facility: CLINIC | Age: 35
End: 2025-03-14
Payer: COMMERCIAL

## 2025-03-14 NOTE — LETTER
Star Rollins  [id number] [facility] [id number]    March 16, 2025     Dear Ms. Rollins:    Below are the results from your recent visit:    Your labs are all within normal limits and look good.      .    If you have any questions or concerns, please don't hesitate to call.         Sincerely,        NICKY Parrish

## 2025-04-26 DIAGNOSIS — R10.13 EPIGASTRIC ABDOMINAL PAIN: ICD-10-CM

## 2025-04-27 RX ORDER — PANTOPRAZOLE SODIUM 40 MG/1
40 TABLET, DELAYED RELEASE ORAL DAILY
Qty: 90 TABLET | Refills: 0 | Status: SHIPPED | OUTPATIENT
Start: 2025-04-27

## 2025-06-18 ENCOUNTER — OFFICE VISIT (OUTPATIENT)
Dept: FAMILY MEDICINE CLINIC | Facility: CLINIC | Age: 35
End: 2025-06-18
Payer: COMMERCIAL

## 2025-06-18 VITALS
SYSTOLIC BLOOD PRESSURE: 144 MMHG | BODY MASS INDEX: 48.32 KG/M2 | OXYGEN SATURATION: 98 % | DIASTOLIC BLOOD PRESSURE: 96 MMHG | HEIGHT: 62 IN | HEART RATE: 96 BPM | WEIGHT: 262.6 LBS

## 2025-06-18 DIAGNOSIS — H73.91 DISORDER OF TYMPANIC MEMBRANE OF RIGHT EAR: ICD-10-CM

## 2025-06-18 DIAGNOSIS — Z13.220 LIPID SCREENING: ICD-10-CM

## 2025-06-18 DIAGNOSIS — Z83.3 FAMILY HISTORY OF DIABETES MELLITUS: ICD-10-CM

## 2025-06-18 DIAGNOSIS — Z00.00 ANNUAL PHYSICAL EXAM: Primary | ICD-10-CM

## 2025-06-18 DIAGNOSIS — Z13.29 THYROID DISORDER SCREENING: ICD-10-CM

## 2025-06-18 DIAGNOSIS — I25.10 CARDIOVASCULAR DISEASE: ICD-10-CM

## 2025-06-18 NOTE — PROGRESS NOTES
Subjective   Star Rollins is a 35 y.o. female. Presents today for   Chief Complaint   Patient presents with    Annual Exam    Obesity     Wants to discuss injection medication for weight loss       History Of Present Illness Star Rollins is a 35-year-old female presenting for weight loss and annual physical exam    Beginning weight                                           current weight                                      BMI               262                                                              262                                                 48.03    BMI 48.03      History of Present Illness  The patient presents for weight loss, sleep apnea, and ear issues.    She is interested in initiating a weight loss injection regimen, which was previously discussed. She has been researching the options covered by her insurance.    She underwent a sleep apnea test approximately a year ago, which revealed mild sleep apnea. She reports no recent leg swelling.    She has a history of frequent ear infections during her childhood. She typically does not use Q-tips for ear cleaning. She has previously had ear tubes inserted in both ears, with one ear showing improvement post-procedure while the other did not. She reports no episodes of dizziness.    FAMILY HISTORY  Both sides of the family have a history of diabetes. The patient's father's side has a history of heart problems.    Patient Active Problem List   Diagnosis    Anxiety and depression    Hidradenitis suppurativa    Obesity    Pseudotumor cerebri    General medical exam    Calculus of gallbladder without cholecystitis without obstruction    Ureteropelvic junction (UPJ) obstruction, right    Abdominal pain    Hydronephrosis of right kidney    SHALOM (acute kidney injury)    Acute pyelonephritis       Social History     Socioeconomic History    Marital status: Single   Tobacco Use    Smoking status: Never    Smokeless tobacco: Never   Vaping Use    Vaping  "status: Never Used   Substance and Sexual Activity    Alcohol use: Not Currently    Drug use: No    Sexual activity: Not Currently     Partners: Male     Birth control/protection: I.U.D.       No Known Allergies    Current Outpatient Medications on File Prior to Visit   Medication Sig Dispense Refill    acetaZOLAMIDE (DIAMOX) 250 MG tablet Take 1 tablet by mouth Daily.      clindamycin (CLINDAGEL) 1 % gel Apply 1 Application topically to the appropriate area as directed Daily As Needed.      fluticasone (FLONASE) 50 MCG/ACT nasal spray Administer 1 spray into the nostril(s) as directed by provider Daily As Needed.      levonorgestrel (MIRENA) 20 MCG/24HR IUD 1 each by Intrauterine route 1 (One) Time.      pantoprazole (PROTONIX) 40 MG EC tablet Take 1 tablet by mouth Daily. 90 tablet 0    spironolactone (ALDACTONE) 100 MG tablet Take 1 tablet by mouth Every Night.      [DISCONTINUED] cetirizine (zyrTEC) 10 MG tablet Take 1 tablet by mouth Daily As Needed.       No current facility-administered medications on file prior to visit.       Objective   Vitals:    06/18/25 1128   BP: 144/96   Pulse: 96   SpO2: 98%   Weight: 119 kg (262 lb 9.6 oz)   Height: 157.5 cm (62\")     Body mass index is 48.03 kg/m².    Physical Exam  Scar tissue and a hole are present in the tympanic membrane of the ear.  Lungs sound normal.  Heart sounds normal.  Physical Exam  Constitutional:       Appearance: Normal appearance.   HENT:      Head: Normocephalic and atraumatic.      Mouth/Throat:      Mouth: Mucous membranes are moist.   Cardiovascular:      Rate and Rhythm: Normal rate and regular rhythm.      Pulses: Normal pulses.      Heart sounds: Normal heart sounds.   Pulmonary:      Effort: Pulmonary effort is normal.      Breath sounds: Normal breath sounds.   Neurological:      Mental Status: She is alert.       Results         Procedures     Assessment & Plan   Diagnoses and all orders for this visit:    1. Annual physical exam " (Primary)    2. Family history of diabetes mellitus  -     CBC & Differential  -     Hemoglobin A1c  -     Comprehensive Metabolic Panel    3. Lipid screening  -     Lipid Panel    4. Disorder of tympanic membrane of right ear  -     Ambulatory Referral to ENT (Otolaryngology)    5. Thyroid disorder screening  -     TSH; Future         Assessment & Plan  1. Weight management.  She expressed interest in starting weight loss injections. Lab work will be conducted to assess her current health status. The initiation of medication will be determined based on the lab results, which are expected to be available by tomorrow.    2. Sleep apnea.  She mentioned having a sleep study about a year ago, which indicated mild sleep apnea. The results of the sleep study are not currently in the system. She is advised to search for the study in her records or follow up with the provider who conducted the test.    3. Ruptured tympanic membrane.  She has a history of ear infections and scarring on her tympanic membrane, with a visible hole in one eardrum. She is advised against using Q-tips or flushing her ears and to exercise caution when underwater. A referral to an ENT specialist will be made for further evaluation.    PROCEDURE  The patient previously had ear tubes inserted in both ears.                 Return in about 3 months (around 9/18/2025).     Discussed Care Gaps, ordered referrals and encouraged vaccination updates.       - Pt agrees with plan of care and denies further questions/concerns today  - This document is intended for medical expert use only. Persons  reading this document without medical staff guidance may result in misinterpretation and unintended morbidity     Go to the ER for any possible life-threatening symptoms such as chest pain or shortness of air.      Please allow 3-5 business days for recommendations based on new results      I personally spent time with this patient, preparing for the visit, reviewing  tests, obtaining and/or reviewing a separately obtained history, performing a medically appropriate examination and/or evaluation, counseling and educating the patient/family/caregiver, ordering medications,  documenting information in the medical record and indepentently interpreting results.         Patient or patient representative verbalized consent for the use of Ambient Listening during the visit with  NICKY Parrish for chart documentation. 6/18/2025  16:55 EDT     Answers submitted by the patient for this visit:  Weight Management (Submitted on 6/17/2025)  Chief Complaint: Weight Management  Weight: unchanged  Weight loss treatment: portion control  Eating habit changes: No changes  Energy level: unchanged  Physical activity tolerance: stable  Treatment barriers: none

## 2025-06-19 LAB
ALBUMIN SERPL-MCNC: 4.2 G/DL (ref 3.5–5.2)
ALBUMIN/GLOB SERPL: 1.3 G/DL
ALP SERPL-CCNC: 59 U/L (ref 39–117)
ALT SERPL-CCNC: 13 U/L (ref 1–33)
AST SERPL-CCNC: 18 U/L (ref 1–32)
BASOPHILS # BLD AUTO: 0.06 10*3/MM3 (ref 0–0.2)
BASOPHILS NFR BLD AUTO: 0.7 % (ref 0–1.5)
BILIRUB SERPL-MCNC: 0.4 MG/DL (ref 0–1.2)
BUN SERPL-MCNC: 13 MG/DL (ref 6–20)
BUN/CREAT SERPL: 14.4 (ref 7–25)
CALCIUM SERPL-MCNC: 9.5 MG/DL (ref 8.6–10.5)
CHLORIDE SERPL-SCNC: 103 MMOL/L (ref 98–107)
CHOLEST SERPL-MCNC: 172 MG/DL (ref 0–200)
CO2 SERPL-SCNC: 25.1 MMOL/L (ref 22–29)
CREAT SERPL-MCNC: 0.9 MG/DL (ref 0.57–1)
EGFRCR SERPLBLD CKD-EPI 2021: 86.2 ML/MIN/1.73
EOSINOPHIL # BLD AUTO: 0.26 10*3/MM3 (ref 0–0.4)
EOSINOPHIL NFR BLD AUTO: 3 % (ref 0.3–6.2)
ERYTHROCYTE [DISTWIDTH] IN BLOOD BY AUTOMATED COUNT: 11.6 % (ref 12.3–15.4)
GLOBULIN SER CALC-MCNC: 3.3 GM/DL
GLUCOSE SERPL-MCNC: 92 MG/DL (ref 65–99)
HBA1C MFR BLD: 5.4 % (ref 4.8–5.6)
HCT VFR BLD AUTO: 44.6 % (ref 34–46.6)
HDLC SERPL-MCNC: 41 MG/DL (ref 40–60)
HGB BLD-MCNC: 14.7 G/DL (ref 12–15.9)
IMM GRANULOCYTES # BLD AUTO: 0.03 10*3/MM3 (ref 0–0.05)
IMM GRANULOCYTES NFR BLD AUTO: 0.3 % (ref 0–0.5)
LDLC SERPL CALC-MCNC: 100 MG/DL (ref 0–100)
LYMPHOCYTES # BLD AUTO: 1.76 10*3/MM3 (ref 0.7–3.1)
LYMPHOCYTES NFR BLD AUTO: 20.2 % (ref 19.6–45.3)
MCH RBC QN AUTO: 30.4 PG (ref 26.6–33)
MCHC RBC AUTO-ENTMCNC: 33 G/DL (ref 31.5–35.7)
MCV RBC AUTO: 92.3 FL (ref 79–97)
MONOCYTES # BLD AUTO: 0.62 10*3/MM3 (ref 0.1–0.9)
MONOCYTES NFR BLD AUTO: 7.1 % (ref 5–12)
NEUTROPHILS # BLD AUTO: 5.98 10*3/MM3 (ref 1.7–7)
NEUTROPHILS NFR BLD AUTO: 68.7 % (ref 42.7–76)
NRBC BLD AUTO-RTO: 0 /100 WBC (ref 0–0.2)
PLATELET # BLD AUTO: 366 10*3/MM3 (ref 140–450)
POTASSIUM SERPL-SCNC: 4.1 MMOL/L (ref 3.5–5.2)
PROT SERPL-MCNC: 7.5 G/DL (ref 6–8.5)
RBC # BLD AUTO: 4.83 10*6/MM3 (ref 3.77–5.28)
SODIUM SERPL-SCNC: 141 MMOL/L (ref 136–145)
TRIGL SERPL-MCNC: 177 MG/DL (ref 0–150)
TSH SERPL DL<=0.005 MIU/L-ACNC: 1.89 UIU/ML (ref 0.27–4.2)
VLDLC SERPL CALC-MCNC: 31 MG/DL (ref 5–40)
WBC # BLD AUTO: 8.71 10*3/MM3 (ref 3.4–10.8)

## 2025-06-19 RX ORDER — SEMAGLUTIDE 0.5 MG/.5ML
0.5 INJECTION, SOLUTION SUBCUTANEOUS WEEKLY
Qty: 2 ML | Refills: 2 | Status: SHIPPED | OUTPATIENT
Start: 2025-06-19

## 2025-06-19 RX ORDER — SEMAGLUTIDE 0.25 MG/.5ML
0.25 INJECTION, SOLUTION SUBCUTANEOUS WEEKLY
Qty: 2 ML | Refills: 0 | Status: SHIPPED | OUTPATIENT
Start: 2025-06-19

## 2025-06-27 ENCOUNTER — TELEPHONE (OUTPATIENT)
Dept: FAMILY MEDICINE CLINIC | Facility: CLINIC | Age: 35
End: 2025-06-27

## 2025-06-27 NOTE — TELEPHONE ENCOUNTER
Insurance will not approve, patient is not diabetic.  No co-morbids.  Will need to do pills. I can send in phentermine and topirimate for her if she is amenable.

## 2025-07-24 DIAGNOSIS — R10.13 EPIGASTRIC ABDOMINAL PAIN: ICD-10-CM

## 2025-07-25 RX ORDER — PANTOPRAZOLE SODIUM 40 MG/1
40 TABLET, DELAYED RELEASE ORAL DAILY
Qty: 90 TABLET | Refills: 1 | Status: SHIPPED | OUTPATIENT
Start: 2025-07-25

## (undated) DEVICE — SEALANT WND FIBRIN TISSEEL PREFIL/SYR/PRIMAFZ 4ML

## (undated) DEVICE — SOL ANTISTICK CAUTRY ELECTROLUBE LF

## (undated) DEVICE — SYR LUERLOK 30CC

## (undated) DEVICE — STPCK 3WY D201 DISCOFIX

## (undated) DEVICE — TIP COVER ACCESSORY

## (undated) DEVICE — JACKSON-PRATT 100CC BULB RESERVOIR: Brand: CARDINAL HEALTH

## (undated) DEVICE — LAPAROSCOPIC SMOKE FILTRATION SYSTEM: Brand: PALL LAPAROSHIELD® PLUS LAPAROSCOPIC SMOKE FILTRATION SYSTEM

## (undated) DEVICE — LAPAROVUE VISIBILITY SYSTEM LAPAROSCOPIC SOLUTIONS: Brand: LAPAROVUE

## (undated) DEVICE — SYR LL TP 10ML STRL

## (undated) DEVICE — CATH IV INSYTE AUTOGARD 14G 1 1/2IN ORNG

## (undated) DEVICE — Device

## (undated) DEVICE — BLADELESS OBTURATOR: Brand: WECK VISTA

## (undated) DEVICE — THE STERILE LIGHT HANDLE COVER IS USED WITH STERIS SURGICAL LIGHTING AND VISUALIZATION SYSTEMS.

## (undated) DEVICE — SEAL

## (undated) DEVICE — ANTIBACTERIAL UNDYED BRAIDED (POLYGLACTIN 910), SYNTHETIC ABSORBABLE SUTURE: Brand: COATED VICRYL

## (undated) DEVICE — DRAPE,REIN 53X77,STERILE: Brand: MEDLINE

## (undated) DEVICE — TISSUE RETRIEVAL SYSTEM: Brand: INZII RETRIEVAL SYSTEM

## (undated) DEVICE — ST TBG AIRSEAL FLTR TRI LUM

## (undated) DEVICE — ADHS SKIN SURG TISS VISC PREMIERPRO EXOFIN HI/VISC FAST/DRY

## (undated) DEVICE — ENDOPATH PNEUMONEEDLE INSUFFLATION NEEDLES WITH LUER LOCK CONNECTORS 120MM: Brand: ENDOPATH

## (undated) DEVICE — DRP C/ARM 41X74IN

## (undated) DEVICE — ARM DRAPE

## (undated) DEVICE — SOL NACL 0.9PCT 1000ML

## (undated) DEVICE — SUT SILK 2/0 FS BLK 18IN 685G

## (undated) DEVICE — LOU GENERAL ROBOT: Brand: MEDLINE INDUSTRIES, INC.

## (undated) DEVICE — TROC BLADLES ANCHORPORT/OPTI LP 12X120MM 1P/U

## (undated) DEVICE — CATH URETRL SOF/FLEX OPN/END 4F 70CM

## (undated) DEVICE — SUT MNCRYL PLS ANTIB UD 4/0 PS2 18IN

## (undated) DEVICE — STRIP,CLOSURE,WOUND,MEDI-STRIP,1/2X4: Brand: MEDLINE

## (undated) DEVICE — SUT VIC 0 UR6 27IN VCP603H

## (undated) DEVICE — 3M™ STERI-STRIP™ COMPOUND BENZOIN TINCTURE 40 BAGS/CARTON 4 CARTONS/CASE C1544: Brand: 3M™ STERI-STRIP™

## (undated) DEVICE — SUT VIC 4/0 RB1 27IN J214H

## (undated) DEVICE — PATIENT RETURN ELECTRODE, SINGLE-USE, CONTACT QUALITY MONITORING, ADULT, WITH 9FT CORD, FOR PATIENTS WEIGING OVER 33LBS. (15KG): Brand: MEGADYNE

## (undated) DEVICE — SYR LUERLOK 5CC

## (undated) DEVICE — UNDYED BRAIDED (POLYGLACTIN 910), SYNTHETIC ABSORBABLE SUTURE: Brand: COATED VICRYL

## (undated) DEVICE — APPL CHLORAPREP HI/LITE 26ML ORNG

## (undated) DEVICE — 3M™ STERI-DRAPE™ INSTRUMENT POUCH 1018L: Brand: STERI-DRAPE™

## (undated) DEVICE — SKIN PREP TRAY W/CHG: Brand: MEDLINE INDUSTRIES, INC.

## (undated) DEVICE — DRSNG WND BORDR/ADHS NONADHR/GZ LF 2X2IN STRL

## (undated) DEVICE — NITINOL WIRE WITH HYDROPHILIC TIP: Brand: SENSOR

## (undated) DEVICE — EXTENSION SET, MALE LUER LOCK ADAPTER WITH RETRACTABLE COLLAR

## (undated) DEVICE — COLUMN DRAPE

## (undated) DEVICE — GLV SURG BIOGEL LTX PF 8 1/2

## (undated) DEVICE — LOU LAP CHOLE: Brand: MEDLINE INDUSTRIES, INC.

## (undated) DEVICE — GLV SURG BIOGEL LTX PF 8

## (undated) DEVICE — GOWN,NON-REINFORCED,SIRUS,SET IN SLV,XL: Brand: MEDLINE